# Patient Record
Sex: FEMALE | Race: WHITE | NOT HISPANIC OR LATINO | Employment: FULL TIME | URBAN - METROPOLITAN AREA
[De-identification: names, ages, dates, MRNs, and addresses within clinical notes are randomized per-mention and may not be internally consistent; named-entity substitution may affect disease eponyms.]

---

## 2018-03-20 LAB
A/G RATIO (HISTORICAL): 1.2 (ref 0.7–1.7)
ABNORMAL PROTEIN BAND (HISTORICAL): ABNORMAL G/DL
ALBUMIN ELP (HISTORICAL): 3.8 G/DL (ref 2.9–4.4)
ALPHA 1 (HISTORICAL): 0.2 G/DL (ref 0–0.4)
ALPHA 2 (HISTORICAL): 1.1 G/DL (ref 0.4–1)
BETA GLOBULIN, SERUM (HISTORICAL): 1 G/DL (ref 0.7–1.3)
GAMMA GLOBULIN (HISTORICAL): 1 G/DL (ref 0.4–1.8)
PROT ELEC SERUM (HISTORICAL): ABNORMAL
TOT. GLOBULIN, SERUM (HISTORICAL): 3.3 G/DL (ref 2.2–3.9)
TOTAL PROTEIN (HISTORICAL): 7.1 G/DL (ref 6–8.5)

## 2018-03-21 LAB
IMMUNOGLOBULIN A (HISTORICAL): 196 MG/DL (ref 87–352)
IMMUNOGLOBULIN G (HISTORICAL): 1026 MG/DL (ref 700–1600)
IMMUNOGLOBULIN M (HISTORICAL): 102 MG/DL (ref 26–217)

## 2018-03-23 LAB
BACKGROUND (HISTORICAL): NORMAL
DIRECTOR REVIEW (HISTORICAL): NORMAL
INTERPRETATION (HISTORICAL): NORMAL
Lab: NORMAL %
METHODOLOGY (HISTORICAL): NORMAL

## 2018-10-19 ENCOUNTER — HOSPITAL ENCOUNTER (OUTPATIENT)
Dept: RADIOLOGY | Facility: HOSPITAL | Age: 58
Discharge: HOME | End: 2018-10-19
Payer: COMMERCIAL

## 2018-10-19 ENCOUNTER — TRANSCRIBE ORDERS (OUTPATIENT)
Dept: ADMINISTRATIVE | Facility: HOSPITAL | Age: 58
End: 2018-10-19

## 2018-10-19 DIAGNOSIS — R05.9 COUGH: ICD-10-CM

## 2018-10-19 DIAGNOSIS — R05.9 COUGH: Primary | ICD-10-CM

## 2018-10-19 PROCEDURE — 71046 X-RAY EXAM CHEST 2 VIEWS: CPT

## 2018-10-20 ENCOUNTER — APPOINTMENT (OUTPATIENT)
Dept: LAB | Facility: HOSPITAL | Age: 58
End: 2018-10-20
Payer: COMMERCIAL

## 2018-10-20 ENCOUNTER — TRANSCRIBE ORDERS (OUTPATIENT)
Dept: ADMINISTRATIVE | Facility: HOSPITAL | Age: 58
End: 2018-10-20

## 2018-10-20 DIAGNOSIS — R05.9 COUGH: ICD-10-CM

## 2018-10-20 DIAGNOSIS — R05.9 COUGH: Primary | ICD-10-CM

## 2018-10-20 PROCEDURE — 87070 CULTURE OTHR SPECIMN AEROBIC: CPT

## 2018-10-20 PROCEDURE — 87205 SMEAR GRAM STAIN: CPT

## 2018-10-22 LAB
BACTERIA SPT RESP CULT: NORMAL
GRAM STN SPEC: NORMAL

## 2018-10-27 ENCOUNTER — TRANSCRIBE ORDERS (OUTPATIENT)
Dept: ADMINISTRATIVE | Facility: HOSPITAL | Age: 58
End: 2018-10-27

## 2018-10-27 ENCOUNTER — HOSPITAL ENCOUNTER (OUTPATIENT)
Dept: RADIOLOGY | Facility: HOSPITAL | Age: 58
Discharge: HOME | End: 2018-10-27
Payer: COMMERCIAL

## 2018-10-27 DIAGNOSIS — R93.89 ABNORMAL CHEST X-RAY: Primary | ICD-10-CM

## 2018-10-27 DIAGNOSIS — R93.89 ABNORMAL CHEST X-RAY: ICD-10-CM

## 2018-10-27 PROCEDURE — 71046 X-RAY EXAM CHEST 2 VIEWS: CPT

## 2018-10-30 ENCOUNTER — TRANSCRIBE ORDERS (OUTPATIENT)
Dept: ADMINISTRATIVE | Facility: HOSPITAL | Age: 58
End: 2018-10-30

## 2018-10-30 DIAGNOSIS — Z12.31 ENCOUNTER FOR SCREENING MAMMOGRAM FOR MALIGNANT NEOPLASM OF BREAST: Primary | ICD-10-CM

## 2018-11-13 ENCOUNTER — HOSPITAL ENCOUNTER (OUTPATIENT)
Dept: MAMMOGRAPHY | Facility: HOSPITAL | Age: 58
Discharge: HOME | End: 2018-11-13
Attending: SPECIALIST
Payer: COMMERCIAL

## 2018-11-13 DIAGNOSIS — Z12.31 ENCOUNTER FOR SCREENING MAMMOGRAM FOR MALIGNANT NEOPLASM OF BREAST: ICD-10-CM

## 2018-11-13 PROCEDURE — 77067 SCR MAMMO BI INCL CAD: CPT

## 2018-11-13 PROCEDURE — 77063 BREAST TOMOSYNTHESIS BI: CPT

## 2019-02-11 ENCOUNTER — HOSPITAL ENCOUNTER (EMERGENCY)
Facility: HOSPITAL | Age: 59
Discharge: HOME | End: 2019-02-11
Payer: COMMERCIAL

## 2019-02-11 ENCOUNTER — APPOINTMENT (EMERGENCY)
Dept: CT IMAGING | Facility: HOSPITAL | Age: 59
End: 2019-02-11
Payer: COMMERCIAL

## 2019-02-11 VITALS
HEIGHT: 63 IN | OXYGEN SATURATION: 97 % | RESPIRATION RATE: 16 BRPM | BODY MASS INDEX: 33.13 KG/M2 | HEART RATE: 74 BPM | SYSTOLIC BLOOD PRESSURE: 113 MMHG | DIASTOLIC BLOOD PRESSURE: 65 MMHG | TEMPERATURE: 98.5 F | WEIGHT: 187 LBS

## 2019-02-11 DIAGNOSIS — R10.31 ACUTE RIGHT LOWER QUADRANT PAIN: Primary | ICD-10-CM

## 2019-02-11 LAB
ALBUMIN SERPL BCP-MCNC: 4.2 G/DL (ref 3.5–5.7)
ALP SERPL-CCNC: 62 U/L (ref 40–150)
ALT SERPL W P-5'-P-CCNC: 19 U/L (ref 7–52)
ANION GAP SERPL CALCULATED.3IONS-SCNC: 9 MMOL/L (ref 4–13)
APTT PPP: 38 SECONDS (ref 26–38)
AST SERPL W P-5'-P-CCNC: 16 U/L (ref 13–39)
BASOPHILS # BLD AUTO: 0.1 THOUSANDS/ΜL (ref 0–0.1)
BASOPHILS NFR BLD AUTO: 1 % (ref 0–2)
BILIRUB SERPL-MCNC: 0.2 MG/DL (ref 0.2–1)
BUN SERPL-MCNC: 19 MG/DL (ref 7–25)
CALCIUM SERPL-MCNC: 9.3 MG/DL (ref 8.6–10.5)
CHLORIDE SERPL-SCNC: 104 MMOL/L (ref 98–107)
CO2 SERPL-SCNC: 24 MMOL/L (ref 21–31)
CREAT SERPL-MCNC: 0.91 MG/DL (ref 0.6–1.2)
EOSINOPHIL # BLD AUTO: 0.2 THOUSAND/ΜL (ref 0–0.61)
EOSINOPHIL NFR BLD AUTO: 2 % (ref 0–5)
ERYTHROCYTE [DISTWIDTH] IN BLOOD BY AUTOMATED COUNT: 13.7 % (ref 11.5–14.5)
GFR SERPL CREATININE-BSD FRML MDRD: 71 ML/MIN/1.73SQ M
GLUCOSE SERPL-MCNC: 106 MG/DL (ref 65–99)
HCT VFR BLD AUTO: 41 % (ref 42–47)
HGB BLD-MCNC: 13.3 G/DL (ref 12–16)
INR PPP: 1.01 (ref 0.9–1.5)
LIPASE SERPL-CCNC: 25 U/L (ref 11–82)
LYMPHOCYTES # BLD AUTO: 2.6 THOUSANDS/ΜL (ref 0.6–4.47)
LYMPHOCYTES NFR BLD AUTO: 28 % (ref 21–51)
MCH RBC QN AUTO: 28.9 PG (ref 26–34)
MCHC RBC AUTO-ENTMCNC: 32.3 G/DL (ref 31–37)
MCV RBC AUTO: 89 FL (ref 81–99)
MONOCYTES # BLD AUTO: 0.6 THOUSAND/ΜL (ref 0.17–1.22)
MONOCYTES NFR BLD AUTO: 6 % (ref 2–12)
NEUTROPHILS # BLD AUTO: 5.8 THOUSANDS/ΜL (ref 1.4–6.5)
NEUTS SEG NFR BLD AUTO: 63 % (ref 42–75)
NRBC BLD AUTO-RTO: 0 /100 WBCS
PLATELET # BLD AUTO: 329 THOUSANDS/UL (ref 149–390)
PMV BLD AUTO: 8.3 FL (ref 8.6–11.7)
POTASSIUM SERPL-SCNC: 3.8 MMOL/L (ref 3.5–5.5)
PROT SERPL-MCNC: 7.4 G/DL (ref 6.4–8.9)
PROTHROMBIN TIME: 11.7 SECONDS (ref 10.2–13)
RBC # BLD AUTO: 4.59 MILLION/UL (ref 3.9–5.2)
SODIUM SERPL-SCNC: 137 MMOL/L (ref 134–143)
WBC # BLD AUTO: 9.2 THOUSAND/UL (ref 4.8–10.8)

## 2019-02-11 PROCEDURE — 80053 COMPREHEN METABOLIC PANEL: CPT

## 2019-02-11 PROCEDURE — 83690 ASSAY OF LIPASE: CPT

## 2019-02-11 PROCEDURE — 96374 THER/PROPH/DIAG INJ IV PUSH: CPT

## 2019-02-11 PROCEDURE — 85610 PROTHROMBIN TIME: CPT

## 2019-02-11 PROCEDURE — 36415 COLL VENOUS BLD VENIPUNCTURE: CPT

## 2019-02-11 PROCEDURE — 85025 COMPLETE CBC W/AUTO DIFF WBC: CPT

## 2019-02-11 PROCEDURE — 99284 EMERGENCY DEPT VISIT MOD MDM: CPT

## 2019-02-11 PROCEDURE — 74176 CT ABD & PELVIS W/O CONTRAST: CPT

## 2019-02-11 PROCEDURE — 85730 THROMBOPLASTIN TIME PARTIAL: CPT

## 2019-02-11 RX ORDER — AMLODIPINE BESYLATE 5 MG/1
5 TABLET ORAL DAILY
COMMUNITY

## 2019-02-11 RX ORDER — KETOROLAC TROMETHAMINE 10 MG/1
10 TABLET, FILM COATED ORAL EVERY 6 HOURS PRN
Qty: 12 TABLET | Refills: 0 | Status: SHIPPED | OUTPATIENT
Start: 2019-02-11 | End: 2019-03-22 | Stop reason: ALTCHOICE

## 2019-02-11 RX ORDER — KETOROLAC TROMETHAMINE 30 MG/ML
30 INJECTION, SOLUTION INTRAMUSCULAR; INTRAVENOUS ONCE
Status: COMPLETED | OUTPATIENT
Start: 2019-02-11 | End: 2019-02-11

## 2019-02-11 RX ADMIN — KETOROLAC TROMETHAMINE 30 MG: 30 INJECTION, SOLUTION INTRAMUSCULAR; INTRAVENOUS at 16:10

## 2019-02-11 NOTE — ED PROVIDER NOTES
History  Chief Complaint   Patient presents with    Abdominal Pain     RLQ since Saturday upon awakening    Constipation     possible     Verito Cornejo is a 61-year-old female who came to the emergency department due to right lower quadrant pain that started about 2 days prior to arrival   Patient denies vomiting or diarrhea  Her last bowel movement was today  She denies dysuria, hematuria or frequency of urination  She denies fever or chills  History provided by:  Patient   used: No    Abdominal Pain   Pain location:  RLQ  Pain quality: aching    Pain radiates to:  Does not radiate  Pain severity:  Moderate  Onset quality:  Gradual  Duration:  2 days  Timing:  Constant  Progression:  Worsening  Chronicity:  New  Context: not alcohol use, not awakening from sleep, not diet changes, not eating, not laxative use, not medication withdrawal, not previous surgeries, not recent illness, not recent sexual activity, not recent travel, not retching, not sick contacts, not suspicious food intake and not trauma    Relieved by:  Nothing  Worsened by:  Nothing  Ineffective treatments:  None tried  Associated symptoms: no anorexia, no belching, no chest pain, no chills, no constipation, no cough, no diarrhea, no dysuria, no fatigue, no fever, no flatus, no hematemesis, no hematochezia, no hematuria, no melena, no nausea, no shortness of breath, no sore throat, no vaginal bleeding, no vaginal discharge and no vomiting        Prior to Admission Medications   Prescriptions Last Dose Informant Patient Reported? Taking?    amLODIPine (NORVASC) 5 mg tablet 2/11/2019 at Unknown time  Yes Yes   Sig: Take 5 mg by mouth daily   esomeprazole (NEXIUM) 20 mg capsule 2/10/2019 at Unknown time  Yes Yes   Sig: Take 20 mg by mouth daily      Facility-Administered Medications: None       Past Medical History:   Diagnosis Date    GERD (gastroesophageal reflux disease)     Hypertension        Past Surgical History:   Procedure Laterality Date     SECTION      CHOLECYSTECTOMY         History reviewed  No pertinent family history  I have reviewed and agree with the history as documented  Social History     Tobacco Use    Smoking status: Former Smoker     Last attempt to quit: 2018     Years since quittin 2    Smokeless tobacco: Never Used   Substance Use Topics    Alcohol use: Not Currently    Drug use: Never        Review of Systems   Constitutional: Negative for chills, fatigue and fever  HENT: Negative for sore throat  Eyes: Negative  Respiratory: Negative for cough and shortness of breath  Cardiovascular: Negative for chest pain  Gastrointestinal: Positive for abdominal pain  Negative for anorexia, constipation, diarrhea, flatus, hematemesis, hematochezia, melena, nausea and vomiting  Endocrine: Negative  Genitourinary: Negative for dysuria, hematuria, vaginal bleeding and vaginal discharge  Musculoskeletal: Negative  Skin: Negative  Allergic/Immunologic: Negative  Neurological: Negative  Hematological: Negative  Psychiatric/Behavioral: Negative  Physical Exam  Physical Exam   Constitutional: She is oriented to person, place, and time  She appears well-developed and well-nourished  Non-toxic appearance  She does not appear ill  No distress  HENT:   Head: Normocephalic and atraumatic  Mouth/Throat: Oropharynx is clear and moist  No oropharyngeal exudate  Eyes: Pupils are equal, round, and reactive to light  EOM are normal  No scleral icterus  Cardiovascular: Normal rate, regular rhythm, normal heart sounds and intact distal pulses  Pulmonary/Chest: Effort normal and breath sounds normal  No stridor  No respiratory distress  She has no wheezes  She has no rhonchi  She has no rales  She exhibits no tenderness  Abdominal: Soft  Normal appearance and bowel sounds are normal  There is tenderness in the right lower quadrant   There is no CVA tenderness  Neurological: She is alert and oriented to person, place, and time  Skin: Skin is warm and dry  No rash noted  She is not diaphoretic  No cyanosis or erythema  No pallor  Psychiatric: She has a normal mood and affect  Her behavior is normal    Nursing note and vitals reviewed  Vital Signs  ED Triage Vitals [02/11/19 1542]   Temperature Pulse Respirations Blood Pressure SpO2   98 5 °F (36 9 °C) 84 16 136/63 98 %      Temp Source Heart Rate Source Patient Position - Orthostatic VS BP Location FiO2 (%)   Temporal Monitor Sitting Left arm --      Pain Score       1           Vitals:    02/11/19 1542   BP: 136/63   Pulse: 84   Patient Position - Orthostatic VS: Sitting       Visual Acuity      ED Medications  Medications   ketorolac (TORADOL) injection 30 mg (30 mg Intravenous Given 2/11/19 1610)       Diagnostic Studies  Results Reviewed     Procedure Component Value Units Date/Time    Lipase [[id number]]  (Normal) Collected:  02/11/19 1606    Lab Status:  Final result Specimen:  Blood from Arm, Left Updated:  02/11/19 1634     Lipase 25 u/L     Comprehensive metabolic panel [[id number]]  (Abnormal) Collected:  02/11/19 1606    Lab Status:  Final result Specimen:  Blood from Arm, Left Updated:  02/11/19 1634     Sodium 137 mmol/L      Potassium 3 8 mmol/L      Chloride 104 mmol/L      CO2 24 mmol/L      ANION GAP 9 mmol/L      BUN 19 mg/dL      Creatinine 0 91 mg/dL      Glucose 106 mg/dL      Calcium 9 3 mg/dL      AST 16 U/L      ALT 19 U/L      Alkaline Phosphatase 62 U/L      Total Protein 7 4 g/dL      Albumin 4 2 g/dL      Total Bilirubin 0 20 mg/dL      eGFR 71 ml/min/1 73sq m     Narrative:       National Kidney Disease Education Program recommendations are as follows:  GFR calculation is accurate only with a steady state creatinine  Chronic Kidney disease less than 60 ml/min/1 73 sq  meters  Kidney failure less than 15 ml/min/1 73 sq  meters      Protime-INR [[id number]]  (Normal) Collected: 02/11/19 1606    Lab Status:  Final result Specimen:  Blood from Arm, Left Updated:  02/11/19 1626     Protime 11 7 seconds      INR 1 01    APTT [[id number]]  (Normal) Collected:  02/11/19 1606    Lab Status:  Final result Specimen:  Blood from Arm, Left Updated:  02/11/19 1626     PTT 38 seconds     CBC and differential [[id number]]  (Abnormal) Collected:  02/11/19 1606    Lab Status:  Final result Specimen:  Blood from Arm, Left Updated:  02/11/19 1613     WBC 9 20 Thousand/uL      RBC 4 59 Million/uL      Hemoglobin 13 3 g/dL      Hematocrit 41 0 %      MCV 89 fL      MCH 28 9 pg      MCHC 32 3 g/dL      RDW 13 7 %      MPV 8 3 fL      Platelets 866 Thousands/uL      nRBC 0 /100 WBCs      Neutrophils Relative 63 %      Lymphocytes Relative 28 %      Monocytes Relative 6 %      Eosinophils Relative 2 %      Basophils Relative 1 %      Neutrophils Absolute 5 80 Thousands/µL      Lymphocytes Absolute 2 60 Thousands/µL      Monocytes Absolute 0 60 Thousand/µL      Eosinophils Absolute 0 20 Thousand/µL      Basophils Absolute 0 10 Thousands/µL     UA w Reflex to Microscopic [[id number]]     Lab Status:  No result Specimen:  Urine                  CT abdomen pelvis wo contrast   Final Result by Darinel Bucio MD (02/11 1641)      No acute intra-abdominal abnormality  No free air or free fluid  Normal appendix visualized  Workstation performed: [id number]                    Procedures  Procedures       Phone Contacts  ED Phone Contact    ED Course  ED Course as of Feb 11 1653   Mon Feb 11, 2019   1650 Patient is resting comfortably on the stretcher  I discussed with her the results of her blood work and the CT scan of the abdomen pelvis which showed no sign of acute appendicitis                                    MDM  Number of Diagnoses or Management Options  Acute right lower quadrant pain: new and requires workup     Amount and/or Complexity of Data Reviewed  Clinical lab tests: ordered and reviewed  Tests in the radiology section of CPT®: ordered and reviewed  Tests in the medicine section of CPT®: ordered and reviewed    Risk of Complications, Morbidity, and/or Mortality  Presenting problems: low  Diagnostic procedures: low  Management options: low    Patient Progress  Patient progress: stable      Disposition  Final diagnoses:   Acute right lower quadrant pain     Time reflects when diagnosis was documented in both MDM as applicable and the Disposition within this note     Time User Action Codes Description Comment    2/11/2019  3:50 PM Nishant Carl Add [R10 31] Acute right lower quadrant pain       ED Disposition     ED Disposition Condition Date/Time Comment    Discharge Stable Mon Feb 11, 9450  9:37 PM [facility] discharge to home/self care  Follow-up Information     Follow up With Specialties Details Why Contact Info    Emily Powers, DO Internal Medicine In 3 days  [address]  [phone]            Patient's Medications   Discharge Prescriptions    KETOROLAC (TORADOL) 10 MG TABLET    Take 1 tablet (10 mg total) by mouth every 6 (six) hours as needed for moderate pain for up to 12 doses       Start Date: 2/11/2019 End Date: --       Order Dose: 10 mg       Quantity: 12 tablet    Refills: 0     No discharge procedures on file      ED Provider  Electronically Signed by           Maggie Hancock MD  02/11/19 8744

## 2019-03-22 ENCOUNTER — OFFICE VISIT (OUTPATIENT)
Dept: URGENT CARE | Facility: CLINIC | Age: 59
End: 2019-03-22
Payer: COMMERCIAL

## 2019-03-22 VITALS
HEART RATE: 77 BPM | TEMPERATURE: 97.5 F | OXYGEN SATURATION: 99 % | HEIGHT: 63 IN | WEIGHT: 187 LBS | BODY MASS INDEX: 33.13 KG/M2 | RESPIRATION RATE: 16 BRPM | SYSTOLIC BLOOD PRESSURE: 163 MMHG | DIASTOLIC BLOOD PRESSURE: 84 MMHG

## 2019-03-22 DIAGNOSIS — A60.1 HERPES SIMPLEX INFECTION OF PERIANAL SKIN: Primary | ICD-10-CM

## 2019-03-22 DIAGNOSIS — B37.9 CANDIDA ALBICANS INFECTION: ICD-10-CM

## 2019-03-22 PROCEDURE — 99203 OFFICE O/P NEW LOW 30 MIN: CPT | Performed by: NURSE PRACTITIONER

## 2019-03-22 RX ORDER — BUPROPION HYDROCHLORIDE 100 MG/1
150 TABLET ORAL
COMMUNITY
End: 2019-03-22 | Stop reason: ALTCHOICE

## 2019-03-22 RX ORDER — VALACYCLOVIR HYDROCHLORIDE 1 G/1
1000 TABLET, FILM COATED ORAL 2 TIMES DAILY
Qty: 14 TABLET | Refills: 0 | Status: SHIPPED | OUTPATIENT
Start: 2019-03-22 | End: 2021-10-07 | Stop reason: ALTCHOICE

## 2019-03-22 RX ORDER — CLOTRIMAZOLE AND BETAMETHASONE DIPROPIONATE 10; .64 MG/G; MG/G
CREAM TOPICAL 2 TIMES DAILY
Qty: 30 G | Refills: 0 | Status: SHIPPED | OUTPATIENT
Start: 2019-03-22 | End: 2021-10-07 | Stop reason: ALTCHOICE

## 2019-03-22 RX ORDER — HYDROCODONE BITARTRATE AND ACETAMINOPHEN 5; 325 MG/1; MG/1
TABLET ORAL
Refills: 0 | COMMUNITY
Start: 2019-03-13 | End: 2019-03-22 | Stop reason: ALTCHOICE

## 2019-03-22 RX ORDER — OMEPRAZOLE 40 MG/1
CAPSULE, DELAYED RELEASE ORAL
Refills: 6 | COMMUNITY
Start: 2019-01-09 | End: 2019-03-22 | Stop reason: ALTCHOICE

## 2019-03-22 NOTE — PATIENT INSTRUCTIONS
Genital Herpes Simplex   WHAT YOU NEED TO KNOW:   Genital herpes is a sexually transmitted infection (STI) that is caused by the herpes simplex virus (HSV)  It is spread through oral, vaginal, or anal sex  It may be spread even if you do not see blisters  It can also be spread to other areas of your body, including your eyes, by touching open blisters  If you are pregnant, it may be spread to your baby while he is still in your womb or during vaginal delivery  Unprotected sex or sex with multiple partners increases your risk for genital herpes  DISCHARGE INSTRUCTIONS:   Call 911 for any of the following:   · You have trouble breathing  · You have a seizure  · Your neck is stiff  · You have trouble thinking clearly  Contact your healthcare provider if:   · You have chills or a fever  · You have painful blisters on your penis, vagina, anus, or mouth  · Fluid or blood is coming out of your genitals  · You have trouble urinating  · You think you are pregnant and you are bleeding from your vagina  · You have trouble chewing or swallowing  · Your symptoms do not get better, or they get worse, even after treatment  · You have questions or concerns about your condition or care  Medicines:   · Antivirals  may help decrease your symptoms  · Numbing cream or ointment  may help decrease pain  · NSAIDs , such as ibuprofen, help decrease swelling, pain, and fever  This medicine is available with or without a doctor's order  NSAIDs can cause stomach bleeding or kidney problems in certain people  If you take blood thinner medicine, always ask your healthcare provider if NSAIDs are safe for you  Always read the medicine label and follow directions  · Take your medicine as directed  Contact your healthcare provider if you think your medicine is not helping or if you have side effects  Tell him or her if you are allergic to any medicine   Keep a list of the medicines, vitamins, and herbs you take  Include the amounts, and when and why you take them  Bring the list or the pill bottles to follow-up visits  Carry your medicine list with you in case of an emergency  Manage your symptoms:  Do the following to be more comfortable when your infection is active:  · Keep the blisters clean and dry  Wash them with soap and warm water, and pat dry gently  · Wear cotton underwear and loose clothing  This may help to keep the blisters dry and keep clothes from rubbing  · Apply ice  on the area for 15 to 20 minutes every hour or as directed  Use an ice pack, or put crushed ice in a plastic bag  Cover it with a towel  Ice helps prevent tissue damage and decreases swelling and pain  · Apply heat  on the area for 20 to 30 minutes every 2 hours for as many days as directed  A warm bath may also help  Heat helps decrease pain and muscle spasms  Prevent the spread of genital herpes:   · Use condoms  Use a latex condom when you have oral, genital, and anal sex  Use a new condom each time  Use a polyurethane condom if you are allergic to latex  · Try not to touch your blisters  Wash your hands before and after you touch the area  Do not kiss anyone if you have blisters around your mouth  Do not breastfeed if you have blisters on your breast      · Tell your partners  that you have genital herpes  Do not have sex until he or she knows that you have genital herpes  Ask your healthcare provider for ways to tell partners about your infection  · Tell your healthcare providers  that you have genital herpes  If you are pregnant, your baby may need special monitoring  Inform your healthcare provider of your condition to avoid spreading the infection to your baby  Follow up with your healthcare provider as directed:  Write down your questions so you remember to ask them during your visits     © 2017 [employer] Information is for End User's use only and may not be sold, redistributed or otherwise used for commercial purposes  All illustrations and images included in CareNotes® are the copyrighted property of A [employer]  or Jese Torres  The above information is an  only  It is not intended as medical advice for individual conditions or treatments  Talk to your doctor, nurse or pharmacist before following any medical regimen to see if it is safe and effective for you

## 2019-03-22 NOTE — PROGRESS NOTES
[facility]        NAME: Rl Gregory is a 66 y o  female  : 1960    MRN: [id number]  DATE: 2019  TIME: 5:50 PM    Assessment and Plan   Herpes simplex infection of perianal skin [A60 1]  1  Herpes simplex infection of perianal skin  valACYclovir (VALTREX) 1,000 mg tablet   2  Candida albicans infection  clotrimazole-betamethasone (LOTRISONE) 1-0 05 % cream         Patient Instructions       Follow up with PCP in 3-5 days  Proceed to  ER if symptoms worsen  Chief Complaint     Chief Complaint   Patient presents with    Wound Infection     buttocks    Herpes Zoster         History of Present Illness       53-year-old female with a history of genital herpes  She reports a flare-up of genital herpes for about 1 week  In addition to the genital herpes she is experiencing perianal redness, pain, itching  Review of Systems   Review of Systems   Constitutional: Negative  HENT: Negative  Eyes: Negative  Respiratory: Negative  Cardiovascular: Negative  Gastrointestinal: Negative  Genitourinary: Positive for genital sores  Negative for vaginal bleeding and vaginal discharge  Musculoskeletal: Negative  Skin:        Perianal redness, itching, pain  Neurological: Negative            Current Medications       Current Outpatient Medications:     amLODIPine (NORVASC) 5 mg tablet, Take 5 mg by mouth daily, Disp: , Rfl:     esomeprazole (NEXIUM) 20 mg capsule, Take 20 mg by mouth daily, Disp: , Rfl:     clotrimazole-betamethasone (LOTRISONE) 1-0 05 % cream, Apply topically 2 (two) times a day, Disp: 30 g, Rfl: 0    valACYclovir (VALTREX) 1,000 mg tablet, Take 1 tablet (1,000 mg total) by mouth 2 (two) times a day for 7 days, Disp: 14 tablet, Rfl: 0    Current Allergies     Allergies as of 2019    (No Known Allergies)            The following portions of the patient's history were reviewed and updated as appropriate: allergies, current medications, past family history, past medical history, past social history, past surgical history and problem list      Past Medical History:   Diagnosis Date    Anxiety     Depression     GERD (gastroesophageal reflux disease)     Hypertension        Past Surgical History:   Procedure Laterality Date     SECTION      CHOLECYSTECTOMY         History reviewed  No pertinent family history  Medications have been verified  Objective   /84   Pulse 77   Temp 97 5 °F (36 4 °C)   Resp 16   Ht 5' 3" (1 6 m)   Wt 84 8 kg (187 lb)   SpO2 99%   BMI 33 13 kg/m²        Physical Exam     Physical Exam   Constitutional: She is oriented to person, place, and time  She appears well-developed and well-nourished  No distress  HENT:   Head: Normocephalic and atraumatic  Right Ear: External ear normal    Left Ear: External ear normal    Mouth/Throat: Oropharynx is clear and moist    Eyes: Pupils are equal, round, and reactive to light  Conjunctivae and EOM are normal    Neck: Normal range of motion  Neck supple  Cardiovascular: Normal rate, regular rhythm and normal heart sounds  Pulmonary/Chest: Effort normal and breath sounds normal    Abdominal: Soft  Bowel sounds are normal    Genitourinary:         Genitourinary Comments: There are scattered vesicles noted around the vagina on the labia majora and on her upper thighs  Neurological: She is alert and oriented to person, place, and time  Skin: Skin is warm  She is not diaphoretic  Psychiatric: She has a normal mood and affect   Her behavior is normal  Judgment and thought content normal

## 2019-08-19 ENCOUNTER — OFFICE VISIT (OUTPATIENT)
Dept: SURGERY | Facility: CLINIC | Age: 59
End: 2019-08-19
Payer: COMMERCIAL

## 2019-08-19 VITALS
DIASTOLIC BLOOD PRESSURE: 72 MMHG | RESPIRATION RATE: 16 BRPM | SYSTOLIC BLOOD PRESSURE: 132 MMHG | WEIGHT: 189 LBS | TEMPERATURE: 98.3 F | HEART RATE: 76 BPM | HEIGHT: 63 IN | BODY MASS INDEX: 33.49 KG/M2

## 2019-08-19 DIAGNOSIS — M67.441 GANGLION CYST OF FLEXOR TENDON SHEATH OF FINGER OF RIGHT HAND: Primary | ICD-10-CM

## 2019-08-19 PROCEDURE — 99203 OFFICE O/P NEW LOW 30 MIN: CPT | Performed by: SURGERY

## 2019-08-19 NOTE — ASSESSMENT & PLAN NOTE
The patient is a pleasant 61-year-old right-hand-dominant female presenting with an 8 month history of a lump on the palmar surface of the proximal phalanx of the right ring finger  The patient works as an occupational therapist   She has been conscious of this palpable lump beneath the skin of the right ring finger for 8 months  It is becoming increasingly symptomatic on daily basis  She is interested in definitive treatment  She does note she has a history for psoriasis involving the skin of her upper extremities and hands  She is treated intermittently with steroids  She has no history of use of biologic agents  On physical examination she is a pleasant well-nourished well-appearing female  She is awake alert oriented  She has a pleasant disposition  She is a competent historian  Palpation of the proximal phalanx of the right ring finger on the palmar surface confirms the presence of a tendon sheath ganglion cyst     The diagnosis was explained to the patient  In light of this technically sensitive area I have advised definitive treatment by Dr Carlos William of hand surgery  The referral has been made  All questions were answered to the satisfaction of the patient she is agreeable to the treatment plan as outlined above

## 2019-08-19 NOTE — PROGRESS NOTES
Assessment/Plan:    Ganglion cyst of flexor tendon sheath of finger of right hand  The patient is a pleasant 61-year-old right-hand-dominant female presenting with an 8 month history of a lump on the palmar surface of the proximal phalanx of the right ring finger  The patient works as an occupational therapist   She has been conscious of this palpable lump beneath the skin of the right ring finger for 8 months  It is becoming increasingly symptomatic on daily basis  She is interested in definitive treatment  She does note she has a history for psoriasis involving the skin of her upper extremities and hands  She is treated intermittently with steroids  She has no history of use of biologic agents  On physical examination she is a pleasant well-nourished well-appearing female  She is awake alert oriented  She has a pleasant disposition  She is a competent historian  Palpation of the proximal phalanx of the right ring finger on the palmar surface confirms the presence of a tendon sheath ganglion cyst     The diagnosis was explained to the patient  In light of this technically sensitive area I have advised definitive treatment by Dr Tj Tejeda of hand surgery  The referral has been made  All questions were answered to the satisfaction of the patient she is agreeable to the treatment plan as outlined above  Diagnoses and all orders for this visit:    Ganglion cyst of flexor tendon sheath of finger of right hand  -     Ambulatory referral to Orthopedic Surgery; Future          Subjective:      Patient ID: Tammy Sanchez is a 64 y o  female  08-  Patient is here today for a cyst on her ring finger right hand  Stated that she has had this cyst for about 8 months was a size a baked bean and know a size of a kidney bean and if tries to hold a handle it hurts  Colorectal screening was reviewed with the patient and she is following up with [facility]      The following portions of the patient's history were reviewed and updated as appropriate: allergies, current medications, past family history, past medical history, past social history, past surgical history and problem list     Review of Systems   All other systems reviewed and are negative  Objective:      /72   Pulse 76   Temp 98 3 °F (36 8 °C) (Tympanic)   Resp 16   Ht 5' 3" (1 6 m)   Wt 85 7 kg (189 lb)   BMI 33 48 kg/m²          Physical Exam   Constitutional: She is oriented to person, place, and time  She appears well-developed and well-nourished  HENT:   Head: Normocephalic and atraumatic  Eyes: Pupils are equal, round, and reactive to light  Conjunctivae are normal    Neck: Normal range of motion  Neck supple  Cardiovascular: Normal rate and regular rhythm  Pulmonary/Chest: Effort normal and breath sounds normal    Abdominal: Soft  Bowel sounds are normal    Musculoskeletal: Normal range of motion  Palpable subcutaneous lump on the palmar surface of the proximal phalanx of the right ring finger  Physical findings consistent with the diagnosis of a tendon sheath ganglion cyst    Neurological: She is alert and oriented to person, place, and time  Skin: Skin is warm and dry     Psychiatric: Her behavior is normal  Judgment and thought content normal

## 2019-08-19 NOTE — LETTER
August 19, 2019     DO Serenity Cardoza  [address] [place] [address]    Patient: Juan Francisco Krishnamurthy   YOB: 1960   Date of Visit: 8/19/2019       Dear Dr Brooke Michaud: Thank you for referring Beverley Rowley to me for evaluation  Below are my notes for this consultation  If you have questions, please do not hesitate to call me  I look forward to following your patient along with you  Sincerely,        Raeford Alpers, MD        CC: No Recipients  Raeford Alpers, MD  8/19/2019  9:21 AM  Incomplete  Assessment/Plan:    Ganglion cyst of flexor tendon sheath of finger of right hand  The patient is a pleasant 61-year-old right-hand-dominant female presenting with an 8 month history of a lump on the palmar surface of the proximal phalanx of the right ring finger  The patient works as an occupational therapist   She has been conscious of this palpable lump beneath the skin of the right ring finger for 8 months  It is becoming increasingly symptomatic on daily basis  She is interested in definitive treatment  She does note she has a history for psoriasis involving the skin of her upper extremities and hands  She is treated intermittently with steroids  She has no history of use of biologic agents  On physical examination she is a pleasant well-nourished well-appearing female  She is awake alert oriented  She has a pleasant disposition  She is a competent historian  Palpation of the proximal phalanx of the right ring finger on the palmar surface confirms the presence of a tendon sheath ganglion cyst     The diagnosis was explained to the patient  In light of this technically sensitive area I have advised definitive treatment by Dr Jo Plaza of hand surgery  The referral has been made  All questions were answered to the satisfaction of the patient she is agreeable to the treatment plan as outlined above         Diagnoses and all orders for this visit:    Ganglion cyst of flexor tendon sheath of finger of right hand  -     Ambulatory referral to Orthopedic Surgery; Future          Subjective:      Patient ID: Baljinder Roman is a 64 y o  female  08-  Patient is here today for a cyst on her ring finger right hand  Stated that she has had this cyst for about 8 months was a size a baked bean and know a size of a kidney bean and if tries to hold a handle it hurts  Colorectal screening was reviewed with the patient and she is following up with [facility]  Catherine Harris      The following portions of the patient's history were reviewed and updated as appropriate: allergies, current medications, past family history, past medical history, past social history, past surgical history and problem list     Review of Systems   All other systems reviewed and are negative  Objective:      /72   Pulse 76   Temp 98 3 °F (36 8 °C) (Tympanic)   Resp 16   Ht 5' 3" (1 6 m)   Wt 85 7 kg (189 lb)   BMI 33 48 kg/m²           Physical Exam   Constitutional: She is oriented to person, place, and time  She appears well-developed and well-nourished  HENT:   Head: Normocephalic and atraumatic  Eyes: Pupils are equal, round, and reactive to light  Conjunctivae are normal    Neck: Normal range of motion  Neck supple  Cardiovascular: Normal rate and regular rhythm  Pulmonary/Chest: Effort normal and breath sounds normal    Abdominal: Soft  Bowel sounds are normal    Musculoskeletal: Normal range of motion  Palpable subcutaneous lump on the palmar surface of the proximal phalanx of the right ring finger  Physical findings consistent with the diagnosis of a tendon sheath ganglion cyst    Neurological: She is alert and oriented to person, place, and time  Skin: Skin is warm and dry     Psychiatric: Her behavior is normal  Judgment and thought content normal

## 2019-09-23 ENCOUNTER — TRANSCRIBE ORDERS (OUTPATIENT)
Dept: LAB | Facility: HOSPITAL | Age: 59
End: 2019-09-23

## 2019-09-23 ENCOUNTER — APPOINTMENT (OUTPATIENT)
Dept: LAB | Facility: HOSPITAL | Age: 59
End: 2019-09-23
Payer: COMMERCIAL

## 2019-09-23 DIAGNOSIS — E78.2 MIXED HYPERLIPIDEMIA: ICD-10-CM

## 2019-09-23 DIAGNOSIS — I10 HYPERTENSION, ESSENTIAL: Primary | ICD-10-CM

## 2019-09-23 DIAGNOSIS — I10 HYPERTENSION, ESSENTIAL: ICD-10-CM

## 2019-09-23 LAB
ALBUMIN SERPL BCP-MCNC: 4.5 G/DL (ref 3.5–5.7)
ALP SERPL-CCNC: 65 U/L (ref 40–150)
ALT SERPL W P-5'-P-CCNC: 18 U/L (ref 7–52)
ANION GAP SERPL CALCULATED.3IONS-SCNC: 9 MMOL/L (ref 4–13)
AST SERPL W P-5'-P-CCNC: 17 U/L (ref 13–39)
BACTERIA UR QL AUTO: ABNORMAL /HPF
BASOPHILS # BLD AUTO: 0.1 THOUSANDS/ΜL (ref 0–0.1)
BASOPHILS NFR BLD AUTO: 1 % (ref 0–2)
BILIRUB SERPL-MCNC: 0.4 MG/DL (ref 0.2–1)
BILIRUB UR QL STRIP: NEGATIVE
BUN SERPL-MCNC: 12 MG/DL (ref 7–25)
CALCIUM SERPL-MCNC: 9.2 MG/DL (ref 8.6–10.5)
CHLORIDE SERPL-SCNC: 101 MMOL/L (ref 98–107)
CHOLEST SERPL-MCNC: 216 MG/DL (ref 0–200)
CLARITY UR: CLEAR
CO2 SERPL-SCNC: 27 MMOL/L (ref 21–31)
COLOR UR: YELLOW
CREAT SERPL-MCNC: 0.78 MG/DL (ref 0.6–1.2)
EOSINOPHIL # BLD AUTO: 0.4 THOUSAND/ΜL (ref 0–0.61)
EOSINOPHIL NFR BLD AUTO: 4 % (ref 0–5)
ERYTHROCYTE [DISTWIDTH] IN BLOOD BY AUTOMATED COUNT: 14.9 % (ref 11.5–14.5)
GFR SERPL CREATININE-BSD FRML MDRD: 85 ML/MIN/1.73SQ M
GLUCOSE P FAST SERPL-MCNC: 103 MG/DL (ref 65–99)
GLUCOSE UR STRIP-MCNC: NEGATIVE MG/DL
HCT VFR BLD AUTO: 43.1 % (ref 42–47)
HDLC SERPL-MCNC: 48 MG/DL (ref 40–60)
HGB BLD-MCNC: 14.1 G/DL (ref 12–16)
HGB UR QL STRIP.AUTO: ABNORMAL
KETONES UR STRIP-MCNC: NEGATIVE MG/DL
LDLC SERPL CALC-MCNC: 118 MG/DL (ref 0–100)
LEUKOCYTE ESTERASE UR QL STRIP: ABNORMAL
LYMPHOCYTES # BLD AUTO: 1.6 THOUSANDS/ΜL (ref 0.6–4.47)
LYMPHOCYTES NFR BLD AUTO: 15 % (ref 21–51)
MCH RBC QN AUTO: 29.7 PG (ref 26–34)
MCHC RBC AUTO-ENTMCNC: 32.7 G/DL (ref 31–37)
MCV RBC AUTO: 91 FL (ref 81–99)
MONOCYTES # BLD AUTO: 0.7 THOUSAND/ΜL (ref 0.17–1.22)
MONOCYTES NFR BLD AUTO: 7 % (ref 2–12)
NEUTROPHILS # BLD AUTO: 7.9 THOUSANDS/ΜL (ref 1.4–6.5)
NEUTS SEG NFR BLD AUTO: 75 % (ref 42–75)
NITRITE UR QL STRIP: NEGATIVE
NON-SQ EPI CELLS URNS QL MICRO: ABNORMAL /HPF
NONHDLC SERPL-MCNC: 168 MG/DL
PH UR STRIP.AUTO: 6 [PH]
PLATELET # BLD AUTO: 316 THOUSANDS/UL (ref 149–390)
PMV BLD AUTO: 8.8 FL (ref 8.6–11.7)
POTASSIUM SERPL-SCNC: 3.3 MMOL/L (ref 3.5–5.5)
PROT SERPL-MCNC: 8.1 G/DL (ref 6.4–8.9)
PROT UR STRIP-MCNC: NEGATIVE MG/DL
RBC # BLD AUTO: 4.73 MILLION/UL (ref 3.9–5.2)
RBC #/AREA URNS AUTO: ABNORMAL /HPF
SODIUM SERPL-SCNC: 137 MMOL/L (ref 134–143)
SP GR UR STRIP.AUTO: 1.02 (ref 1–1.03)
TRIGL SERPL-MCNC: 249 MG/DL (ref 44–166)
UROBILINOGEN UR QL STRIP.AUTO: 0.2 E.U./DL
WBC # BLD AUTO: 10.7 THOUSAND/UL (ref 4.8–10.8)
WBC #/AREA URNS AUTO: ABNORMAL /HPF

## 2019-09-23 PROCEDURE — 85025 COMPLETE CBC W/AUTO DIFF WBC: CPT

## 2019-09-23 PROCEDURE — 80053 COMPREHEN METABOLIC PANEL: CPT

## 2019-09-23 PROCEDURE — 81001 URINALYSIS AUTO W/SCOPE: CPT

## 2019-09-23 PROCEDURE — 80061 LIPID PANEL: CPT

## 2019-09-23 PROCEDURE — 36415 COLL VENOUS BLD VENIPUNCTURE: CPT

## 2019-09-23 PROCEDURE — 81003 URINALYSIS AUTO W/O SCOPE: CPT

## 2019-09-27 ENCOUNTER — OFFICE VISIT (OUTPATIENT)
Dept: OBGYN CLINIC | Facility: CLINIC | Age: 59
End: 2019-09-27
Payer: COMMERCIAL

## 2019-09-27 VITALS — WEIGHT: 190 LBS | BODY MASS INDEX: 33.66 KG/M2

## 2019-09-27 DIAGNOSIS — M67.441 GANGLION CYST OF FLEXOR TENDON SHEATH OF FINGER OF RIGHT HAND: Primary | ICD-10-CM

## 2019-09-27 PROCEDURE — 99203 OFFICE O/P NEW LOW 30 MIN: CPT | Performed by: ORTHOPAEDIC SURGERY

## 2019-09-27 NOTE — PROGRESS NOTES
CHIEF COMPLAINT:  Chief Complaint   Patient presents with    Right Hand - Cyst       SUBJECTIVE:  Enma Sanchez is a 64y o  year old  female who presents to the office for evaluation of a lump over the volar ulnar aspect of the of the proximal phalanx of the right ring finger  Pt states that she first noticed the lump about 1 year ago but it has become larger in size over the past year  Pt states that it is not painful but she is no longer able to wear rigs on that finger  PAST MEDICAL HISTORY:  Past Medical History:   Diagnosis Date    Anxiety     Depression     GERD (gastroesophageal reflux disease)     Hypertension        PAST SURGICAL HISTORY:  Past Surgical History:   Procedure Laterality Date     SECTION      CHOLECYSTECTOMY         FAMILY HISTORY:  Family History   Problem Relation Age of Onset    Hypertension Mother     Transient ischemic attack Mother     Throat cancer Maternal Grandmother        SOCIAL HISTORY:  Social History     Tobacco Use    Smoking status: Former Smoker     Last attempt to quit: 2018     Years since quittin 9    Smokeless tobacco: Never Used   Substance Use Topics    Alcohol use: Not Currently    Drug use: Never       MEDICATIONS:    Current Outpatient Medications:     amLODIPine (NORVASC) 5 mg tablet, Take 5 mg by mouth daily, Disp: , Rfl:     clotrimazole-betamethasone (LOTRISONE) 1-0 05 % cream, Apply topically 2 (two) times a day, Disp: 30 g, Rfl: 0    esomeprazole (NEXIUM) 20 mg capsule, Take 20 mg by mouth daily, Disp: , Rfl:     valACYclovir (VALTREX) 1,000 mg tablet, Take 1 tablet (1,000 mg total) by mouth 2 (two) times a day for 7 days, Disp: 14 tablet, Rfl: 0    ALLERGIES:  No Known Allergies    REVIEW OF SYSTEMS:  Review of Systems   Constitutional: Negative for chills, fever and unexpected weight change  HENT: Negative for hearing loss, nosebleeds and sore throat  Eyes: Negative for pain, redness and visual disturbance  Respiratory: Negative for cough, shortness of breath and wheezing  Cardiovascular: Negative for chest pain, palpitations and leg swelling  Gastrointestinal: Negative for abdominal pain, nausea and vomiting  Endocrine: Negative for polydipsia and polyuria  Genitourinary: Negative for dysuria and hematuria  Skin: Negative for rash and wound  Neurological: Negative for dizziness and headaches  Psychiatric/Behavioral: Negative for decreased concentration, dysphoric mood and suicidal ideas  The patient is not nervous/anxious  VITALS:  There were no vitals filed for this visit  LABS:  HgA1c: No results found for: HGBA1C  BMP:   Lab Results   Component Value Date    CALCIUM 9 2 09/23/2019    K 3 3 (L) 09/23/2019    CO2 27 09/23/2019     09/23/2019    BUN 12 09/23/2019    CREATININE 0 78 09/23/2019       _____________________________________________________  PHYSICAL EXAMINATION:  General: well developed and well nourished, alert, oriented times 3 and appears comfortable  Psychiatric: Normal  HEENT: Trachea Midline, No torticollis  Pulmonary: No audible wheezing or respiratory distress   Skin: No Erythema, No Lacerations, No Fluctuation, No Ulcerations  Neurovascular: Sensation Intact to the Median, Ulnar, Radial Nerve, Motor Intact to the Median, Ulnar, Radial Nerve and Pulses Intact    MUSCULOSKELETAL EXAMINATION:  Right ring finger  Small palpable cyst like structure on the ulna volar aspect of the proximal phalanx  Skin intact     ___________________________________________________  STUDIES REVIEWED:  No studies reviewed         PROCEDURES PERFORMED:  Aspiration   Date/Time: 9/27/2019 12:47 PM  Consent given by: patient  Site marked: site marked  Timeout: Immediately prior to procedure a time out was called to verify the correct patient, procedure, equipment, support staff and site/side marked as required   Supporting Documentation  Indications: therapeutic   Procedure Details  Condition comment: ganglion cyst of the flexor tendon sheath of the right ring finger   Preparation: Patient was prepped and draped in the usual sterile fashion  Needle size: 25 G  Ultrasound guidance: no  Patient tolerance: patient tolerated the procedure well with no immediate complications  Dressing:  Sterile dressing applied              _____________________________________________________  ASSESSMENT/PLAN:    Ganglion cyst of the flexor tendon sheath- right ring finger   *aspiration was preformed today without complications  * Pt was advised that if the cyst returns we will consider repeat aspiration or excision  * Pt was advised to call the office if she has any questions or concerns      Follow Up:  Return if symptoms worsen or fail to improve          To Do Next Visit:  Re-evaluation of current issue    Scribe Attestation    I,:   Colby Barragan am acting as a scribe while in the presence of the attending physician :        I,:   Sandra Swain MD personally performed the services described in this documentation    as scribed in my presence :

## 2019-09-30 ENCOUNTER — TRANSCRIBE ORDERS (OUTPATIENT)
Dept: LAB | Facility: HOSPITAL | Age: 59
End: 2019-09-30

## 2019-09-30 ENCOUNTER — APPOINTMENT (OUTPATIENT)
Dept: LAB | Facility: HOSPITAL | Age: 59
End: 2019-09-30
Payer: COMMERCIAL

## 2019-09-30 DIAGNOSIS — E87.6 HYPOPOTASSEMIA: ICD-10-CM

## 2019-09-30 DIAGNOSIS — E87.6 HYPOPOTASSEMIA: Primary | ICD-10-CM

## 2019-09-30 LAB
ANION GAP SERPL CALCULATED.3IONS-SCNC: 6 MMOL/L (ref 4–13)
BUN SERPL-MCNC: 18 MG/DL (ref 7–25)
CALCIUM SERPL-MCNC: 9.6 MG/DL (ref 8.6–10.5)
CHLORIDE SERPL-SCNC: 103 MMOL/L (ref 98–107)
CO2 SERPL-SCNC: 29 MMOL/L (ref 21–31)
CREAT SERPL-MCNC: 0.95 MG/DL (ref 0.6–1.2)
GFR SERPL CREATININE-BSD FRML MDRD: 67 ML/MIN/1.73SQ M
GLUCOSE P FAST SERPL-MCNC: 103 MG/DL (ref 65–99)
POTASSIUM SERPL-SCNC: 3.9 MMOL/L (ref 3.5–5.5)
SODIUM SERPL-SCNC: 138 MMOL/L (ref 134–143)

## 2019-09-30 PROCEDURE — 80048 BASIC METABOLIC PNL TOTAL CA: CPT

## 2019-09-30 PROCEDURE — 36415 COLL VENOUS BLD VENIPUNCTURE: CPT

## 2020-09-16 ENCOUNTER — APPOINTMENT (OUTPATIENT)
Dept: LAB | Facility: HOSPITAL | Age: 60
End: 2020-09-16
Payer: COMMERCIAL

## 2020-09-16 ENCOUNTER — TRANSCRIBE ORDERS (OUTPATIENT)
Dept: LAB | Facility: HOSPITAL | Age: 60
End: 2020-09-16

## 2020-09-16 DIAGNOSIS — I10 HYPERTENSION, UNSPECIFIED TYPE: ICD-10-CM

## 2020-09-16 DIAGNOSIS — E78.5 HYPERLIPIDEMIA, UNSPECIFIED HYPERLIPIDEMIA TYPE: ICD-10-CM

## 2020-09-16 DIAGNOSIS — N39.0 URINARY TRACT INFECTION WITHOUT HEMATURIA, SITE UNSPECIFIED: ICD-10-CM

## 2020-09-16 DIAGNOSIS — N39.0 URINARY TRACT INFECTION WITHOUT HEMATURIA, SITE UNSPECIFIED: Primary | ICD-10-CM

## 2020-09-16 LAB
ALBUMIN SERPL BCP-MCNC: 4.7 G/DL (ref 3.5–5.7)
ALP SERPL-CCNC: 55 U/L (ref 40–150)
ALT SERPL W P-5'-P-CCNC: 33 U/L (ref 7–52)
ANION GAP SERPL CALCULATED.3IONS-SCNC: 8 MMOL/L (ref 4–13)
AST SERPL W P-5'-P-CCNC: 22 U/L (ref 13–39)
BASOPHILS # BLD AUTO: 0.1 THOUSANDS/ΜL (ref 0–0.1)
BASOPHILS NFR BLD AUTO: 1 % (ref 0–2)
BILIRUB SERPL-MCNC: 0.3 MG/DL (ref 0.2–1)
BILIRUB UR QL STRIP: NEGATIVE
BUN SERPL-MCNC: 16 MG/DL (ref 7–25)
CALCIUM SERPL-MCNC: 9.4 MG/DL (ref 8.6–10.5)
CHLORIDE SERPL-SCNC: 104 MMOL/L (ref 98–107)
CLARITY UR: CLEAR
CO2 SERPL-SCNC: 28 MMOL/L (ref 21–31)
COLOR UR: YELLOW
CREAT SERPL-MCNC: 0.76 MG/DL (ref 0.6–1.2)
EOSINOPHIL # BLD AUTO: 0.2 THOUSAND/ΜL (ref 0–0.61)
EOSINOPHIL NFR BLD AUTO: 2 % (ref 0–5)
ERYTHROCYTE [DISTWIDTH] IN BLOOD BY AUTOMATED COUNT: 14.9 % (ref 11.5–14.5)
GFR SERPL CREATININE-BSD FRML MDRD: 87 ML/MIN/1.73SQ M
GLUCOSE P FAST SERPL-MCNC: 106 MG/DL (ref 65–99)
GLUCOSE UR STRIP-MCNC: NEGATIVE MG/DL
HCT VFR BLD AUTO: 42.6 % (ref 42–47)
HGB BLD-MCNC: 14.2 G/DL (ref 12–16)
HGB UR QL STRIP.AUTO: NEGATIVE
KETONES UR STRIP-MCNC: NEGATIVE MG/DL
LDLC SERPL DIRECT ASSAY-MCNC: 128.8 MG/DL (ref 0–100)
LEUKOCYTE ESTERASE UR QL STRIP: NEGATIVE
LYMPHOCYTES # BLD AUTO: 2.7 THOUSANDS/ΜL (ref 0.6–4.47)
LYMPHOCYTES NFR BLD AUTO: 31 % (ref 21–51)
MCH RBC QN AUTO: 29.7 PG (ref 26–34)
MCHC RBC AUTO-ENTMCNC: 33.5 G/DL (ref 31–37)
MCV RBC AUTO: 89 FL (ref 81–99)
MONOCYTES # BLD AUTO: 0.6 THOUSAND/ΜL (ref 0.17–1.22)
MONOCYTES NFR BLD AUTO: 7 % (ref 2–12)
NEUTROPHILS # BLD AUTO: 5.3 THOUSANDS/ΜL (ref 1.4–6.5)
NEUTS SEG NFR BLD AUTO: 60 % (ref 42–75)
NITRITE UR QL STRIP: NEGATIVE
PH UR STRIP.AUTO: 5.5 [PH]
PLATELET # BLD AUTO: 309 THOUSANDS/UL (ref 149–390)
PMV BLD AUTO: 8.3 FL (ref 8.6–11.7)
POTASSIUM SERPL-SCNC: 3.8 MMOL/L (ref 3.5–5.5)
PROT SERPL-MCNC: 8.1 G/DL (ref 6.4–8.9)
PROT UR STRIP-MCNC: NEGATIVE MG/DL
RBC # BLD AUTO: 4.79 MILLION/UL (ref 3.9–5.2)
SODIUM SERPL-SCNC: 140 MMOL/L (ref 134–143)
SP GR UR STRIP.AUTO: 1.02 (ref 1–1.03)
TRIGL SERPL-MCNC: 117 MG/DL (ref 44–166)
UROBILINOGEN UR QL STRIP.AUTO: 0.2 E.U./DL
WBC # BLD AUTO: 8.9 THOUSAND/UL (ref 4.8–10.8)

## 2020-09-16 PROCEDURE — 80053 COMPREHEN METABOLIC PANEL: CPT

## 2020-09-16 PROCEDURE — 85025 COMPLETE CBC W/AUTO DIFF WBC: CPT

## 2020-09-16 PROCEDURE — 81003 URINALYSIS AUTO W/O SCOPE: CPT

## 2020-09-16 PROCEDURE — 87077 CULTURE AEROBIC IDENTIFY: CPT

## 2020-09-16 PROCEDURE — 84478 ASSAY OF TRIGLYCERIDES: CPT

## 2020-09-16 PROCEDURE — 87181 SC STD AGAR DILUTION PER AGT: CPT

## 2020-09-16 PROCEDURE — 87086 URINE CULTURE/COLONY COUNT: CPT

## 2020-09-16 PROCEDURE — 87186 SC STD MICRODIL/AGAR DIL: CPT

## 2020-09-16 PROCEDURE — 83721 ASSAY OF BLOOD LIPOPROTEIN: CPT

## 2020-09-16 PROCEDURE — 36415 COLL VENOUS BLD VENIPUNCTURE: CPT

## 2020-09-19 LAB
BACTERIA UR CULT: ABNORMAL
BACTERIA UR CULT: ABNORMAL

## 2020-09-21 ENCOUNTER — TRANSCRIBE ORDERS (OUTPATIENT)
Dept: ADMINISTRATIVE | Facility: HOSPITAL | Age: 60
End: 2020-09-21

## 2020-09-21 DIAGNOSIS — Z12.31 ENCOUNTER FOR SCREENING MAMMOGRAM FOR MALIGNANT NEOPLASM OF BREAST: Primary | ICD-10-CM

## 2020-09-21 DIAGNOSIS — Z78.0 POST-MENOPAUSAL: ICD-10-CM

## 2020-10-01 ENCOUNTER — HOSPITAL ENCOUNTER (OUTPATIENT)
Dept: MAMMOGRAPHY | Facility: HOSPITAL | Age: 60
Discharge: HOME | End: 2020-10-01
Attending: SPECIALIST
Payer: COMMERCIAL

## 2020-10-01 ENCOUNTER — HOSPITAL ENCOUNTER (OUTPATIENT)
Dept: BONE DENSITY | Facility: HOSPITAL | Age: 60
Discharge: HOME | End: 2020-10-01
Attending: SPECIALIST
Payer: COMMERCIAL

## 2020-10-01 VITALS — BODY MASS INDEX: 33.31 KG/M2 | WEIGHT: 188 LBS | HEIGHT: 63 IN

## 2020-10-01 DIAGNOSIS — Z12.31 ENCOUNTER FOR SCREENING MAMMOGRAM FOR MALIGNANT NEOPLASM OF BREAST: ICD-10-CM

## 2020-10-01 DIAGNOSIS — Z78.0 POST-MENOPAUSAL: ICD-10-CM

## 2020-10-01 PROCEDURE — 77080 DXA BONE DENSITY AXIAL: CPT

## 2020-10-01 PROCEDURE — 77067 SCR MAMMO BI INCL CAD: CPT

## 2020-10-01 PROCEDURE — 77063 BREAST TOMOSYNTHESIS BI: CPT

## 2021-05-03 ENCOUNTER — TRANSCRIBE ORDERS (OUTPATIENT)
Dept: LAB | Facility: HOSPITAL | Age: 61
End: 2021-05-03

## 2021-05-03 ENCOUNTER — APPOINTMENT (OUTPATIENT)
Dept: LAB | Facility: HOSPITAL | Age: 61
End: 2021-05-03
Payer: COMMERCIAL

## 2021-05-03 DIAGNOSIS — R73.01 IMPAIRED FASTING GLUCOSE: ICD-10-CM

## 2021-05-03 DIAGNOSIS — E78.5 HYPERLIPIDEMIA, UNSPECIFIED HYPERLIPIDEMIA TYPE: Primary | ICD-10-CM

## 2021-05-03 DIAGNOSIS — I10 HYPERTENSION, UNSPECIFIED TYPE: ICD-10-CM

## 2021-05-03 DIAGNOSIS — E78.5 HYPERLIPIDEMIA, UNSPECIFIED HYPERLIPIDEMIA TYPE: ICD-10-CM

## 2021-05-03 LAB
ALBUMIN SERPL BCP-MCNC: 4.4 G/DL (ref 3.5–5.7)
ALP SERPL-CCNC: 57 U/L (ref 40–150)
ALT SERPL W P-5'-P-CCNC: 16 U/L (ref 7–52)
ANION GAP SERPL CALCULATED.3IONS-SCNC: 12 MMOL/L (ref 4–13)
AST SERPL W P-5'-P-CCNC: 15 U/L (ref 13–39)
BACTERIA UR QL AUTO: ABNORMAL /HPF
BILIRUB SERPL-MCNC: 0.5 MG/DL (ref 0.2–1)
BILIRUB UR QL STRIP: NEGATIVE
BUN SERPL-MCNC: 14 MG/DL (ref 7–25)
CALCIUM SERPL-MCNC: 9.2 MG/DL (ref 8.6–10.5)
CHLORIDE SERPL-SCNC: 98 MMOL/L (ref 98–107)
CHOLEST SERPL-MCNC: 197 MG/DL (ref 0–200)
CLARITY UR: CLEAR
CO2 SERPL-SCNC: 27 MMOL/L (ref 21–31)
COLOR UR: YELLOW
CREAT SERPL-MCNC: 0.79 MG/DL (ref 0.6–1.2)
ERYTHROCYTE [DISTWIDTH] IN BLOOD BY AUTOMATED COUNT: 13.6 % (ref 11.5–14.5)
GFR SERPL CREATININE-BSD FRML MDRD: 83 ML/MIN/1.73SQ M
GLUCOSE P FAST SERPL-MCNC: 111 MG/DL (ref 65–99)
GLUCOSE UR STRIP-MCNC: NEGATIVE MG/DL
HCT VFR BLD AUTO: 41.8 % (ref 42–47)
HDLC SERPL-MCNC: 46 MG/DL
HGB BLD-MCNC: 13.5 G/DL (ref 12–16)
HGB UR QL STRIP.AUTO: ABNORMAL
KETONES UR STRIP-MCNC: NEGATIVE MG/DL
LDLC SERPL CALC-MCNC: 87 MG/DL (ref 0–100)
LEUKOCYTE ESTERASE UR QL STRIP: ABNORMAL
MCH RBC QN AUTO: 28.7 PG (ref 26–34)
MCHC RBC AUTO-ENTMCNC: 32.2 G/DL (ref 31–37)
MCV RBC AUTO: 89 FL (ref 81–99)
NITRITE UR QL STRIP: NEGATIVE
NON-SQ EPI CELLS URNS QL MICRO: ABNORMAL /HPF
NONHDLC SERPL-MCNC: 151 MG/DL
PH UR STRIP.AUTO: 6 [PH]
PLATELET # BLD AUTO: 311 THOUSANDS/UL (ref 149–390)
PMV BLD AUTO: 8.6 FL (ref 8.6–11.7)
POTASSIUM SERPL-SCNC: 3.4 MMOL/L (ref 3.5–5.5)
PROT SERPL-MCNC: 7.6 G/DL (ref 6.4–8.9)
PROT UR STRIP-MCNC: NEGATIVE MG/DL
RBC # BLD AUTO: 4.7 MILLION/UL (ref 3.9–5.2)
RBC #/AREA URNS AUTO: ABNORMAL /HPF
SODIUM SERPL-SCNC: 137 MMOL/L (ref 134–143)
SP GR UR STRIP.AUTO: 1.01 (ref 1–1.03)
TRIGL SERPL-MCNC: 318 MG/DL (ref 44–166)
UROBILINOGEN UR QL STRIP.AUTO: 0.2 E.U./DL
WBC # BLD AUTO: 9.1 THOUSAND/UL (ref 4.8–10.8)
WBC #/AREA URNS AUTO: ABNORMAL /HPF

## 2021-05-03 PROCEDURE — 81001 URINALYSIS AUTO W/SCOPE: CPT

## 2021-05-03 PROCEDURE — 80061 LIPID PANEL: CPT

## 2021-05-03 PROCEDURE — 85027 COMPLETE CBC AUTOMATED: CPT

## 2021-05-03 PROCEDURE — 36415 COLL VENOUS BLD VENIPUNCTURE: CPT

## 2021-05-03 PROCEDURE — 80053 COMPREHEN METABOLIC PANEL: CPT

## 2021-05-12 ENCOUNTER — TRANSCRIBE ORDERS (OUTPATIENT)
Dept: LAB | Facility: HOSPITAL | Age: 61
End: 2021-05-12

## 2021-05-12 ENCOUNTER — APPOINTMENT (OUTPATIENT)
Dept: LAB | Facility: HOSPITAL | Age: 61
End: 2021-05-12
Payer: COMMERCIAL

## 2021-05-12 DIAGNOSIS — R53.83 FATIGUE, UNSPECIFIED TYPE: ICD-10-CM

## 2021-05-12 DIAGNOSIS — R73.01 IMPAIRED FASTING GLUCOSE: ICD-10-CM

## 2021-05-12 DIAGNOSIS — E87.6 HYPOPOTASSEMIA: ICD-10-CM

## 2021-05-12 DIAGNOSIS — R73.01 IMPAIRED FASTING GLUCOSE: Primary | ICD-10-CM

## 2021-05-12 LAB
ANION GAP SERPL CALCULATED.3IONS-SCNC: 9 MMOL/L (ref 4–13)
BUN SERPL-MCNC: 14 MG/DL (ref 7–25)
CALCIUM SERPL-MCNC: 9.1 MG/DL (ref 8.6–10.5)
CHLORIDE SERPL-SCNC: 105 MMOL/L (ref 98–107)
CO2 SERPL-SCNC: 25 MMOL/L (ref 21–31)
CREAT SERPL-MCNC: 0.63 MG/DL (ref 0.6–1.2)
EST. AVERAGE GLUCOSE BLD GHB EST-MCNC: 114 MG/DL
GFR SERPL CREATININE-BSD FRML MDRD: 99 ML/MIN/1.73SQ M
GLUCOSE P FAST SERPL-MCNC: 112 MG/DL (ref 65–99)
HBA1C MFR BLD: 5.6 %
POTASSIUM SERPL-SCNC: 3.8 MMOL/L (ref 3.5–5.5)
SODIUM SERPL-SCNC: 139 MMOL/L (ref 134–143)
TSH SERPL DL<=0.05 MIU/L-ACNC: 1.94 UIU/ML (ref 0.45–5.33)

## 2021-05-12 PROCEDURE — 84443 ASSAY THYROID STIM HORMONE: CPT

## 2021-05-12 PROCEDURE — 80048 BASIC METABOLIC PNL TOTAL CA: CPT

## 2021-05-12 PROCEDURE — 83036 HEMOGLOBIN GLYCOSYLATED A1C: CPT

## 2021-05-12 PROCEDURE — 36415 COLL VENOUS BLD VENIPUNCTURE: CPT

## 2021-10-07 ENCOUNTER — OFFICE VISIT (OUTPATIENT)
Dept: URGENT CARE | Facility: CLINIC | Age: 61
End: 2021-10-07
Payer: COMMERCIAL

## 2021-10-07 VITALS — TEMPERATURE: 97.8 F | OXYGEN SATURATION: 97 % | HEART RATE: 56 BPM | RESPIRATION RATE: 18 BRPM

## 2021-10-07 DIAGNOSIS — B34.9 VIRAL INFECTION: ICD-10-CM

## 2021-10-07 DIAGNOSIS — Z20.822 EXPOSURE TO CONFIRMED CASE OF COVID-19: ICD-10-CM

## 2021-10-07 DIAGNOSIS — Z20.822 SUSPECTED COVID-19 VIRUS INFECTION: Primary | ICD-10-CM

## 2021-10-07 PROCEDURE — 99213 OFFICE O/P EST LOW 20 MIN: CPT | Performed by: NURSE PRACTITIONER

## 2021-10-07 PROCEDURE — U0003 INFECTIOUS AGENT DETECTION BY NUCLEIC ACID (DNA OR RNA); SEVERE ACUTE RESPIRATORY SYNDROME CORONAVIRUS 2 (SARS-COV-2) (CORONAVIRUS DISEASE [COVID-19]), AMPLIFIED PROBE TECHNIQUE, MAKING USE OF HIGH THROUGHPUT TECHNOLOGIES AS DESCRIBED BY CMS-2020-01-R: HCPCS | Performed by: NURSE PRACTITIONER

## 2021-10-07 PROCEDURE — U0005 INFEC AGEN DETEC AMPLI PROBE: HCPCS | Performed by: NURSE PRACTITIONER

## 2021-10-08 LAB — SARS-COV-2 RNA RESP QL NAA+PROBE: POSITIVE

## 2021-11-11 ENCOUNTER — APPOINTMENT (OUTPATIENT)
Dept: RADIOLOGY | Facility: CLINIC | Age: 61
End: 2021-11-11
Payer: COMMERCIAL

## 2021-11-11 ENCOUNTER — OFFICE VISIT (OUTPATIENT)
Dept: URGENT CARE | Facility: CLINIC | Age: 61
End: 2021-11-11
Payer: COMMERCIAL

## 2021-11-11 VITALS
SYSTOLIC BLOOD PRESSURE: 140 MMHG | HEART RATE: 93 BPM | TEMPERATURE: 97.8 F | RESPIRATION RATE: 18 BRPM | OXYGEN SATURATION: 98 % | DIASTOLIC BLOOD PRESSURE: 69 MMHG

## 2021-11-11 DIAGNOSIS — S92.354A CLOSED NONDISPLACED FRACTURE OF FIFTH METATARSAL BONE OF RIGHT FOOT, INITIAL ENCOUNTER: Primary | ICD-10-CM

## 2021-11-11 DIAGNOSIS — S99.921A INJURY OF RIGHT FOOT, INITIAL ENCOUNTER: ICD-10-CM

## 2021-11-11 PROCEDURE — 73630 X-RAY EXAM OF FOOT: CPT

## 2021-11-11 PROCEDURE — 73610 X-RAY EXAM OF ANKLE: CPT

## 2021-11-11 PROCEDURE — 99214 OFFICE O/P EST MOD 30 MIN: CPT | Performed by: PHYSICIAN ASSISTANT

## 2021-11-12 ENCOUNTER — TELEPHONE (OUTPATIENT)
Dept: OBGYN CLINIC | Facility: HOSPITAL | Age: 61
End: 2021-11-12

## 2021-11-17 ENCOUNTER — OFFICE VISIT (OUTPATIENT)
Dept: OBGYN CLINIC | Facility: CLINIC | Age: 61
End: 2021-11-17
Payer: COMMERCIAL

## 2021-11-17 VITALS
HEART RATE: 90 BPM | SYSTOLIC BLOOD PRESSURE: 145 MMHG | WEIGHT: 188 LBS | DIASTOLIC BLOOD PRESSURE: 81 MMHG | HEIGHT: 63 IN | BODY MASS INDEX: 33.31 KG/M2

## 2021-11-17 DIAGNOSIS — S92.354A CLOSED NONDISPLACED FRACTURE OF FIFTH METATARSAL BONE OF RIGHT FOOT, INITIAL ENCOUNTER: Primary | ICD-10-CM

## 2021-11-17 PROCEDURE — 99213 OFFICE O/P EST LOW 20 MIN: CPT | Performed by: ORTHOPAEDIC SURGERY

## 2021-11-17 RX ORDER — MOMETASONE FUROATE 1 MG/G
OINTMENT TOPICAL
COMMUNITY
Start: 2021-09-15

## 2021-12-15 ENCOUNTER — OFFICE VISIT (OUTPATIENT)
Dept: OBGYN CLINIC | Facility: CLINIC | Age: 61
End: 2021-12-15
Payer: COMMERCIAL

## 2021-12-15 ENCOUNTER — APPOINTMENT (OUTPATIENT)
Dept: RADIOLOGY | Facility: CLINIC | Age: 61
End: 2021-12-15
Payer: COMMERCIAL

## 2021-12-15 VITALS
DIASTOLIC BLOOD PRESSURE: 89 MMHG | HEIGHT: 63 IN | WEIGHT: 188 LBS | HEART RATE: 49 BPM | SYSTOLIC BLOOD PRESSURE: 131 MMHG | BODY MASS INDEX: 33.31 KG/M2

## 2021-12-15 DIAGNOSIS — S92.354D CLOSED NONDISPLACED FRACTURE OF FIFTH METATARSAL BONE OF RIGHT FOOT WITH ROUTINE HEALING, SUBSEQUENT ENCOUNTER: ICD-10-CM

## 2021-12-15 DIAGNOSIS — S92.354D CLOSED NONDISPLACED FRACTURE OF FIFTH METATARSAL BONE OF RIGHT FOOT WITH ROUTINE HEALING, SUBSEQUENT ENCOUNTER: Primary | ICD-10-CM

## 2021-12-15 PROCEDURE — 99213 OFFICE O/P EST LOW 20 MIN: CPT | Performed by: ORTHOPAEDIC SURGERY

## 2021-12-15 PROCEDURE — 73630 X-RAY EXAM OF FOOT: CPT

## 2022-01-19 ENCOUNTER — APPOINTMENT (OUTPATIENT)
Dept: RADIOLOGY | Facility: CLINIC | Age: 62
End: 2022-01-19
Payer: COMMERCIAL

## 2022-01-19 ENCOUNTER — OFFICE VISIT (OUTPATIENT)
Dept: OBGYN CLINIC | Facility: CLINIC | Age: 62
End: 2022-01-19
Payer: COMMERCIAL

## 2022-01-19 VITALS — WEIGHT: 188 LBS | BODY MASS INDEX: 33.31 KG/M2 | HEIGHT: 63 IN

## 2022-01-19 DIAGNOSIS — S92.354D CLOSED NONDISPLACED FRACTURE OF FIFTH METATARSAL BONE OF RIGHT FOOT WITH ROUTINE HEALING, SUBSEQUENT ENCOUNTER: ICD-10-CM

## 2022-01-19 DIAGNOSIS — S92.354D CLOSED NONDISPLACED FRACTURE OF FIFTH METATARSAL BONE OF RIGHT FOOT WITH ROUTINE HEALING, SUBSEQUENT ENCOUNTER: Primary | ICD-10-CM

## 2022-01-19 PROCEDURE — 73630 X-RAY EXAM OF FOOT: CPT

## 2022-01-19 PROCEDURE — 99213 OFFICE O/P EST LOW 20 MIN: CPT | Performed by: ORTHOPAEDIC SURGERY

## 2022-01-19 NOTE — PROGRESS NOTES
Chief Complaint  Right foot pain    History Of Presenting Illness  Duarte Carlson  presents with right foot pain due to fracture of the right 5th metatarsal shaft sustained 2021  Patient presents for evaluation of x-rays  Right foot is asymptomatic       Current Medications  Current Outpatient Medications   Medication Sig Dispense Refill    amLODIPine (NORVASC) 5 mg tablet Take 5 mg by mouth daily      esomeprazole (NEXIUM) 20 mg capsule Take 20 mg by mouth daily      mometasone (ELOCON) 0 1 % ointment        No current facility-administered medications for this visit         Current Problems    Active Problems:   Patient Active Problem List    Diagnosis Date Noted    Ganglion cyst of flexor tendon sheath of finger of right hand 2019         Review of Systems:    General: negative for - chills, fatigue, fever,  weight gain or weight loss  Psychological: negative for - anxiety, behavioral disorder, concentration difficulties  Ophthalmic: negative for - blurry vision, decreased vision, double vision,      Past Medical History:   Past Medical History:   Diagnosis Date    Anxiety     Depression     GERD (gastroesophageal reflux disease)     Hypertension        Past Surgical History:   Past Surgical History:   Procedure Laterality Date     SECTION      CHOLECYSTECTOMY         Family History:  Family history reviewed and non-contributory  Family History   Problem Relation Age of Onset    Hypertension Mother     Transient ischemic attack Mother     Throat cancer Maternal Grandmother     No Known Problems Father     No Known Problems Sister     No Known Problems Daughter     Breast cancer Maternal Aunt     No Known Problems Paternal Aunt     No Known Problems Paternal Aunt     Cancer Paternal Aunt     No Known Problems Paternal [de-identified]     Breast cancer Paternal Aunt        Social History:  Social History     Socioeconomic History    Marital status: /Civil Union     Spouse name: None    Number of children: None    Years of education: None    Highest education level: None   Occupational History    None   Tobacco Use    Smoking status: Former Smoker     Quit date: 11/1/2018     Years since quitting: 3 2    Smokeless tobacco: Never Used   Vaping Use    Vaping Use: Never used   Substance and Sexual Activity    Alcohol use: Not Currently    Drug use: Never    Sexual activity: None   Other Topics Concern    None   Social History Narrative    None     Social Determinants of Health     Financial Resource Strain: Not on file   Food Insecurity: Not on file   Transportation Needs: Not on file   Physical Activity: Not on file   Stress: Not on file   Social Connections: Not on file   Intimate Partner Violence: Not on file   Housing Stability: Not on file       Allergies:   No Known Allergies        Physical ExaminationHt 5' 3" (1 6 m)   Wt 85 3 kg (188 lb)   BMI 33 30 kg/m²   Gen: Alert and oriented to person, place, time  HEENT: EOMI, eyes clear, moist mucus membranes, hearing intact      Orthopedic Exam    Bilateral mild pedal edema right foot no swelling or tenderness excellent range of pain-free motion radiographs satisfactory          Impression  Healing right 5th metatarsal shaft fracture            Plan    Patient allowed all activities as tolerated  Patient will discuss with the PCP bilateral pedal edema and the need for possible adjustment of her hypertensive medication  Follow-up isiah hancock n  Oscar Tran MD        Portions of the record may have been created with voice recognition software  Occasional wrong word or "sound a like" substitutions may have occurred due to the inherent limitations of voice recognition software  Read the chart carefully and recognize, using context, where substitutions have occurred

## 2022-05-13 ENCOUNTER — APPOINTMENT (EMERGENCY)
Dept: RADIOLOGY | Facility: HOSPITAL | Age: 62
End: 2022-05-13
Payer: COMMERCIAL

## 2022-05-13 ENCOUNTER — APPOINTMENT (EMERGENCY)
Dept: CT IMAGING | Facility: HOSPITAL | Age: 62
End: 2022-05-13
Payer: COMMERCIAL

## 2022-05-13 ENCOUNTER — HOSPITAL ENCOUNTER (EMERGENCY)
Facility: HOSPITAL | Age: 62
Discharge: HOME | End: 2022-05-13
Attending: EMERGENCY MEDICINE
Payer: COMMERCIAL

## 2022-05-13 VITALS
DIASTOLIC BLOOD PRESSURE: 69 MMHG | RESPIRATION RATE: 18 BRPM | TEMPERATURE: 98 F | OXYGEN SATURATION: 96 % | HEART RATE: 89 BPM | SYSTOLIC BLOOD PRESSURE: 148 MMHG
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
## 2022-05-13 DIAGNOSIS — S06.0X9A CONCUSSION: ICD-10-CM

## 2022-05-13 DIAGNOSIS — T14.8XXA SKIN ABRASION: ICD-10-CM

## 2022-05-13 PROCEDURE — 71250 CT THORAX DX C-: CPT

## 2022-05-13 PROCEDURE — 90715 TDAP VACCINE 7 YRS/> IM: CPT | Performed by: PHYSICIAN ASSISTANT

## 2022-05-13 PROCEDURE — 71045 X-RAY EXAM CHEST 1 VIEW: CPT

## 2022-05-13 PROCEDURE — 70450 CT HEAD/BRAIN W/O DYE: CPT

## 2022-05-13 PROCEDURE — 90471 IMMUNIZATION ADMIN: CPT

## 2022-05-13 PROCEDURE — 99284 EMERGENCY DEPT VISIT MOD MDM: CPT

## 2022-05-13 PROCEDURE — 99284 EMERGENCY DEPT VISIT MOD MDM: CPT | Performed by: PHYSICIAN ASSISTANT

## 2022-05-13 RX ORDER — GINSENG 100 MG
1 CAPSULE ORAL ONCE
Status: COMPLETED | OUTPATIENT
Start: 2022-05-13 | End: 2022-05-13

## 2022-05-13 RX ORDER — ACETAMINOPHEN 325 MG/1
975 TABLET ORAL ONCE
Status: COMPLETED | OUTPATIENT
Start: 2022-05-13 | End: 2022-05-13

## 2022-05-13 RX ADMIN — BACITRACIN ZINC 1 SMALL APPLICATION: 500 OINTMENT TOPICAL at 15:13

## 2022-05-13 RX ADMIN — ACETAMINOPHEN 975 MG: 325 TABLET ORAL at 14:13

## 2022-05-13 RX ADMIN — TETANUS TOXOID, REDUCED DIPHTHERIA TOXOID AND ACELLULAR PERTUSSIS VACCINE, ADSORBED 0.5 ML: 5; 2.5; 8; 8; 2.5 SUSPENSION INTRAMUSCULAR at 14:13
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
## 2022-05-13 NOTE — DISCHARGE INSTRUCTIONS
Take 1000mg of tylenol up to 3 times daily as needed for pain    The following findings require follow up:  Radiographic finding   Finding: Dependent groundglass densities, particularly in the right lower lobe, also seen on the prior study  While atelectasis can have this appearance, there now appears to be mild associated bronchiectasis best visualized in the right lower lobe on   sagittal images and honeycombing on coronal images  This raises the possibility of usual interstitial pneumonia and if there is clinical concern, follow-up high resolution chest CT is recommended  3   Upper lobe paraseptal emphysema     Follow up required: PCP   Follow up should be done within 1 week(s)

## 2022-07-05 ENCOUNTER — OFFICE VISIT (OUTPATIENT)
Dept: URGENT CARE | Facility: CLINIC | Age: 62
End: 2022-07-05
Payer: COMMERCIAL

## 2022-07-05 ENCOUNTER — APPOINTMENT (OUTPATIENT)
Dept: RADIOLOGY | Facility: CLINIC | Age: 62
End: 2022-07-05
Payer: COMMERCIAL

## 2022-07-05 VITALS
WEIGHT: 188 LBS | TEMPERATURE: 97.7 F | BODY MASS INDEX: 33.3 KG/M2 | HEART RATE: 71 BPM | RESPIRATION RATE: 18 BRPM | OXYGEN SATURATION: 99 %

## 2022-07-05 DIAGNOSIS — M25.531 RIGHT WRIST PAIN: ICD-10-CM

## 2022-07-05 DIAGNOSIS — S60.211A CONTUSION OF RIGHT WRIST, INITIAL ENCOUNTER: Primary | ICD-10-CM

## 2022-07-05 PROCEDURE — 99213 OFFICE O/P EST LOW 20 MIN: CPT | Performed by: PHYSICIAN ASSISTANT

## 2022-07-05 PROCEDURE — 73110 X-RAY EXAM OF WRIST: CPT

## 2022-07-05 NOTE — PROGRESS NOTES
[facility]        NAME: Ara Hameed is a 63 y o  female  : 1960    MRN: [id number]  DATE: 2022  TIME: 7:54 PM    Assessment and Plan   Right wrist pain [M25 531]  1  Right wrist pain  XR wrist 3+ vw right         Patient Instructions       Follow up with PCP in 3-5 days  Proceed to  ER if symptoms worsen  Chief Complaint     Chief Complaint   Patient presents with    Wrist Pain     Right x 2 days         History of Present Illness       Patient is a 64 y/o/f presenting [facility] with right wrist pain s/p fall  Patient reports falling two days ago while climbing down set of pool steps  Pt reports striking right wrist and left side of eye on handle  No LOC  Since the fall Pt denies any dizziness, blurred/double vision, headaches  Right wrist is persistent pain and swelling       Wrist Pain   The pain is present in the right wrist  This is a new problem  The current episode started in the past 7 days  There has been a history of trauma  The problem occurs constantly  The problem has been gradually worsening  The quality of the pain is described as aching  The pain is moderate  Pertinent negatives include no fever  Review of Systems   Review of Systems   Constitutional: Negative for chills and fever  HENT: Negative for ear pain and sore throat  Eyes: Negative for pain and visual disturbance  Respiratory: Negative for cough and shortness of breath  Cardiovascular: Negative for chest pain and palpitations  Gastrointestinal: Negative for abdominal pain and vomiting  Genitourinary: Negative for dysuria and hematuria  Musculoskeletal: Positive for arthralgias (Right wrist pain and swelling )  Negative for back pain  Skin: Negative for color change and rash  Neurological: Negative for seizures and syncope  All other systems reviewed and are negative          Current Medications       Current Outpatient Medications:     amLODIPine (NORVASC) 5 mg tablet, Take 5 mg by mouth daily, Disp: , Rfl:     esomeprazole (NexIUM) 20 mg capsule, Take 20 mg by mouth daily, Disp: , Rfl:     mometasone (ELOCON) 0 1 % ointment, , Disp: , Rfl:     Current Allergies     Allergies as of 2022    (No Known Allergies)            The following portions of the patient's history were reviewed and updated as appropriate: allergies, current medications, past family history, past medical history, past social history, past surgical history and problem list      Past Medical History:   Diagnosis Date    Anxiety     Depression     GERD (gastroesophageal reflux disease)     Hypertension        Past Surgical History:   Procedure Laterality Date     SECTION      CHOLECYSTECTOMY         Family History   Problem Relation Age of Onset    Hypertension Mother     Transient ischemic attack Mother     Throat cancer Maternal Grandmother     No Known Problems Father     No Known Problems Sister     No Known Problems Daughter     Breast cancer Maternal Aunt     No Known Problems Paternal Aunt     No Known Problems Paternal Aunt     Cancer Paternal Aunt     No Known Problems Paternal Aunt     Breast cancer Paternal Aunt          Medications have been verified  Objective   Pulse 71   Temp 97 7 °F (36 5 °C)   Resp 18   Wt 85 3 kg (188 lb)   SpO2 99%   BMI 33 30 kg/m²   No LMP recorded  Patient is postmenopausal        Physical Exam     Physical Exam  Constitutional:       Appearance: Normal appearance  HENT:      Head: Normocephalic and atraumatic  Nose: Nose normal       Mouth/Throat:      Mouth: Mucous membranes are moist    Eyes:      Extraocular Movements: Extraocular movements intact  Conjunctiva/sclera: Conjunctivae normal       Pupils: Pupils are equal, round, and reactive to light  Cardiovascular:      Rate and Rhythm: Normal rate     Pulmonary:      Effort: Pulmonary effort is normal    Musculoskeletal:         General: Normal range of motion  Arms:       Cervical back: Normal range of motion and neck supple  Skin:     General: Skin is warm and dry  Capillary Refill: Capillary refill takes less than 2 seconds  Neurological:      General: No focal deficit present  Mental Status: She is alert and oriented to person, place, and time     Psychiatric:         Mood and Affect: Mood normal          Behavior: Behavior normal

## 2022-12-20 ENCOUNTER — HOSPITAL ENCOUNTER (OUTPATIENT)
Dept: MAMMOGRAPHY | Facility: HOSPITAL | Age: 62
Discharge: HOME | End: 2022-12-20
Attending: INTERNAL MEDICINE

## 2022-12-20 ENCOUNTER — HOSPITAL ENCOUNTER (OUTPATIENT)
Dept: ULTRASOUND IMAGING | Facility: HOSPITAL | Age: 62
Discharge: HOME | End: 2022-12-20
Attending: INTERNAL MEDICINE

## 2022-12-20 VITALS — HEIGHT: 63 IN | WEIGHT: 157 LBS | BODY MASS INDEX: 27.82 KG/M2

## 2022-12-20 DIAGNOSIS — N63.10 MASS OF RIGHT BREAST, UNSPECIFIED QUADRANT: ICD-10-CM

## 2022-12-20 NOTE — PROGRESS NOTES
Call placed to patient regarding recommendation for;    __X___ RIGHT ______LEFT      __X___Ultrasound guided  ______Stereotactic breast biopsy  Pt states that procedure was explained to her, additional questions answered at this time    __X___Verbalized understanding        Blood thinners: No: _____ Yes: _____ What:     Biopsy teaching sheet given:  _____yes __X__no (All teaching points discussed during call, pt with no questions at this time, pt adv to arrive at 0730 for 0800 biopsy)    Pt given name/# for any further questions/needs    Pt agreeable to a post procedure call and states we can give her biopsy results to her over the phone

## 2022-12-27 ENCOUNTER — HOSPITAL ENCOUNTER (OUTPATIENT)
Dept: ULTRASOUND IMAGING | Facility: HOSPITAL | Age: 62
Discharge: HOME | End: 2022-12-27

## 2022-12-27 ENCOUNTER — HOSPITAL ENCOUNTER (OUTPATIENT)
Dept: MAMMOGRAPHY | Facility: HOSPITAL | Age: 62
Discharge: HOME | End: 2022-12-27

## 2022-12-27 VITALS — HEART RATE: 72 BPM | DIASTOLIC BLOOD PRESSURE: 83 MMHG | SYSTOLIC BLOOD PRESSURE: 136 MMHG

## 2022-12-27 DIAGNOSIS — N63.10 MASS OF RIGHT BREAST, UNSPECIFIED QUADRANT: ICD-10-CM

## 2022-12-27 RX ORDER — LIDOCAINE HYDROCHLORIDE 10 MG/ML
5 INJECTION, SOLUTION EPIDURAL; INFILTRATION; INTRACAUDAL; PERINEURAL ONCE
Status: DISCONTINUED | OUTPATIENT
Start: 2022-12-27 | End: 2022-12-31 | Stop reason: HOSPADM

## 2022-12-27 NOTE — PROGRESS NOTES
Procedure type:    __X___ultrasound guided _____stereotactic    Breast:    _____Left __X___Right    Location: right breast 2 oclock 3CMFN    Needle: 12 G Kerry    # of passes: 3 passes    Clip: Heart    Performed by: Dr Jason Gonzalez held for 5 minutes by: Sayda Ellington RN     Steri Strips:    ___X__yes _____no    Band aid:    __X___yes_____no    Tolerated procedure:    __X___yes _____no

## 2022-12-27 NOTE — DISCHARGE INSTR - OTHER ORDERS
POST LARGE CORE BREAST BIOPSY PATIENT INFORMATION      Place an ice pack inside your bra over the top of the dressing every hour for 20 minutes (20 minutes on, 60 minutes off)  Do this until bedtime  Do not shower or bathe until the following morning  You may bathe your breast carefully with the steri-strips in place  Be careful    Not to loosen them  The steri-strips will fall off in 3-5 days  You may have mild discomfort, and you may have some bruising where the   Needle entered the skin  This should clear within 5-7 days  If you need medicine for discomfort, take acetaminophen products such as   Tylenol  You may also take Advil or Motrin products  Do not participate in strenuous activities such as-tennis, aerobics, skiing,  Weight lifting, etc  for 24 hours  Refrain from swimming/soaking for 72 hours  Wearing a bra for sleeping may be more comfortable for the first 24-48 hours  Watch for continued bleeding, pain or fever over 101  If any of these symptoms occur, please contact our    breast nurse navigator at the location where your biopsy was performed  During normal business hours (7:30 am-4:00 pm) please call the nurse navigator at the site where your   procedure was performed:    [facility] [address]: [phone] or   [address]: [phone] or [phone]  [address]: [address] [facility]: [address] [place] [address] Street: [phone]              After 4 PM - please call your physician or go to the nearest Emergency Department location  9          The final results of your biopsy are usually available within one week

## 2022-12-27 NOTE — PROGRESS NOTES
Ice pack given:    __X___yes _____no    Discharge instructions signed by patient:    __X___yes _____no    Discharge instructions given to patient:    __X___yes _____no    Discharged via:    __X___amulatory    _____wheelchair    _____stretcher    Stable on discharge:    __X___yes ____no    Post procedure site check complete without any additional bleeding noted  Pt denies any pain upon discharge

## 2022-12-28 NOTE — PROGRESS NOTES
Post procedure call completed    Bleeding: _____yes __X___no (pt denies)    Pain: _____yes ___X___no (pt with tenderness, used ice, took Tylenol x1)    Redness/Swelling: ______yes ___X___no (pt denies)    Band aid removed: ___X__yes _____no    Steri-Strips intact: ___X___yes _____no (discussed with patient to remove steri strips on Sunday if they have not come off on their own)    Pt with no questions at this time, adv will call when results available, adv to call with any questions or concerns, has name/# for contact

## 2022-12-30 ENCOUNTER — TELEPHONE (OUTPATIENT)
Dept: MAMMOGRAPHY | Facility: CLINIC | Age: 62
End: 2022-12-30

## 2023-03-21 ENCOUNTER — HOSPITAL ENCOUNTER (OUTPATIENT)
Dept: ULTRASOUND IMAGING | Facility: HOSPITAL | Age: 63
Discharge: HOME | End: 2023-03-21

## 2023-03-21 DIAGNOSIS — R92.8 ABNORMAL MAMMOGRAM: ICD-10-CM

## 2023-06-08 ENCOUNTER — HOSPITAL ENCOUNTER (OUTPATIENT)
Dept: ULTRASOUND IMAGING | Facility: HOSPITAL | Age: 63
Discharge: HOME | End: 2023-06-08
Payer: COMMERCIAL

## 2023-06-08 DIAGNOSIS — R92.8 ABNORMAL MAMMOGRAM: ICD-10-CM

## 2023-06-08 PROCEDURE — 76642 ULTRASOUND BREAST LIMITED: CPT

## 2024-01-14 ENCOUNTER — OFFICE VISIT (OUTPATIENT)
Dept: URGENT CARE | Facility: CLINIC | Age: 64
End: 2024-01-14
Payer: COMMERCIAL

## 2024-01-14 VITALS
HEART RATE: 82 BPM | DIASTOLIC BLOOD PRESSURE: 75 MMHG | SYSTOLIC BLOOD PRESSURE: 160 MMHG | TEMPERATURE: 98.7 F | OXYGEN SATURATION: 98 % | RESPIRATION RATE: 18 BRPM

## 2024-01-14 DIAGNOSIS — J01.40 ACUTE NON-RECURRENT PANSINUSITIS: Primary | ICD-10-CM

## 2024-01-14 DIAGNOSIS — H10.31 ACUTE BACTERIAL CONJUNCTIVITIS OF RIGHT EYE: ICD-10-CM

## 2024-01-14 DIAGNOSIS — H65.112 ACUTE MUCOID OTITIS MEDIA OF LEFT EAR: ICD-10-CM

## 2024-01-14 PROCEDURE — 99213 OFFICE O/P EST LOW 20 MIN: CPT | Performed by: NURSE PRACTITIONER

## 2024-01-14 RX ORDER — TOBRAMYCIN 3 MG/ML
1 SOLUTION/ DROPS OPHTHALMIC
Qty: 5 ML | Refills: 1 | Status: SHIPPED | OUTPATIENT
Start: 2024-01-14

## 2024-01-14 RX ORDER — AMOXICILLIN 875 MG/1
875 TABLET, COATED ORAL 2 TIMES DAILY
Qty: 20 TABLET | Refills: 0 | Status: SHIPPED | OUTPATIENT
Start: 2024-01-14 | End: 2024-01-24

## 2024-01-14 NOTE — PROGRESS NOTES
[facility]        NAME: Gardenia Hunter is a 63 y.o. female  : 1960    MRN: [id number]  DATE: 2024  TIME: 10:08 AM      Assessment and Plan     Acute non-recurrent pansinusitis [J01.40]  1. Acute non-recurrent pansinusitis  amoxicillin (AMOXIL) 875 mg tablet      2. Acute mucoid otitis media of left ear  amoxicillin (AMOXIL) 875 mg tablet      3. Acute bacterial conjunctivitis of right eye  tobramycin (TOBREX) 0.3 % SOLN            Patient Instructions     Patient Instructions   Change your pillow case tonight and tomorrow night.  Use a clean cloth each time you wipe your eye.  Practice careful handwashing, especially for the first 24 hours of treatment.  If the other eye becomes symptomatic, you may use the same drops the same way for that eye as well.  For the first 24 hours, it is alright to use the drops every 2 hours while awake; after that resume the every 4 hours while awake frequency.  Conjunctivitis   AMBULATORY CARE:   Conjunctivitis , or pink eye, is inflammation of your conjunctiva. The conjunctiva is a thin tissue that covers the front of your eye and the back of your eyelid. The conjunctiva helps protect your eye and keep it moist. The most common cause of conjunctivitis is infection with bacteria or a virus. Allergies or exposure to a chemical may also cause conjunctivitis. Conjunctivitis is easily spread from person to person.       Common signs or symptoms:  You may have symptoms in one or both eyes. You may also have other general symptoms, including a sore throat, runny nose, or fever. You may have any of the following:  Redness in the whites of your eye    Itching in or around your eye    Feeling like there is something in your eye    Watery or thick, sticky discharge    Crusty eyelids when you wake up in the morning    Burning, stinging, or swelling in your eye    Pain when you see bright light    Seek care immediately if:   You have worsening eye  pain.    The swelling in your eye gets worse, even after treatment.    Your vision suddenly becomes worse, or you cannot see at all.    Call your doctor if:   Your start to notice changes in your vision.    You develop a fever and ear pain.    You have tiny bumps or spots of blood on your eye.    You have questions or concerns about your condition or care.    Treatment:  Your conjunctivitis may go away on its own. Treatment depends on what is causing your conjunctivitis. You may need any of the following:  Allergy medicine  helps decrease itchy, red, swollen eyes caused by allergies. It may be given as a pill, eye drops, or nasal spray.    Antibiotics  may be needed if your conjunctivitis is caused by bacteria. This medicine may be given as a pill, eye drops, or eye ointment.    Manage your symptoms:   Apply a cool compress.  Wet a washcloth with cold water and place it on your eye. This will help decrease itching and irritation.    Use artificial tears.  This will help lessen your symptoms, including itching or irritation.    Do not wear contact lenses  until treatment is complete and your symptoms are gone.    Flush your eye.  You may need to flush your eye with saline to help decrease your symptoms. Ask for more information on how to flush your eye.    Prevent the spread of conjunctivitis:   Wash your hands with soap and water often.  Wash your hands before and after you touch your eyes. Wash your hands after you use the bathroom, change a child's diaper, or sneeze. Wash your hands before you prepare or eat food.         Avoid contact with others.  Do not share towels or washcloths. Try to stay away from others as much as possible. Ask when you can return to work or school.    Avoid allergens and irritants.  Try to avoid the things that cause your allergies, such as pets, dust, or grass. Stay away from smoke filled areas. Shield your eyes from wind and sun.    Throw away eye makeup.  Bacteria can stay in eye  makeup. Throw away your current mascara and other eye makeup. Never share mascara or other eye makeup with anyone.    Follow up with your doctor as directed:  You may be referred to an ophthalmologist for treatment. Write down your questions so you remember to ask them during your visits.  © Copyright [employer] 2023 Information is for End User's use only and may not be sold, redistributed or otherwise used for commercial purposes.  The above information is an  only. It is not intended as medical advice for individual conditions or treatments. Talk to your doctor, nurse or pharmacist before following any medical regimen to see if it is safe and effective for you.    Ear Infection   AMBULATORY CARE:   An ear infection  is also called otitis media. Blocked or swollen eustachian tubes can cause an infection. Eustachian tubes connect the middle ear to the back of the nose and throat. They drain fluid from the middle ear. You may have a buildup of fluid in your ear. Germs build up in the fluid and infection develops.       Common signs and symptoms:   Ear pain    Fever or a headache    Trouble hearing    Ringing or buzzing in your ear    Plugged ear or an ear that feels full    Dizziness    Nausea or vomiting    Seek care immediately if:   You have clear fluid coming from your ear.    You have a stiff neck, headache, and a fever.    Call your doctor if:   You see blood or pus draining from your ear.    Your ear pain gets worse or does not go away, even after treatment.    The outside of your ear is red or swollen.    You are vomiting or have diarrhea.    You have questions or concerns about your condition or care.    Medicines:  You may  need any of the following:  Acetaminophen  decreases pain and fever. It is available without a doctor's order. Ask how much to take and how often to take it. Follow directions. Read the labels of all other medicines you are using to see if they also contain acetaminophen, or  ask your doctor or pharmacist. Acetaminophen can cause liver damage if not taken correctly.    NSAIDs , such as ibuprofen, help decrease swelling, pain, and fever. This medicine is available with or without a doctor's order. NSAIDs can cause stomach bleeding or kidney problems in certain people. If you take blood thinner medicine, always ask your healthcare provider if NSAIDs are safe for you. Always read the medicine label and follow directions.    Ear drops  may contain medicine to decrease pain and inflammation.    Antibiotics  help treat a bacterial infection.    Self-care:   Apply heat  on your ear for 15 to 20 minutes, 3 to 4 times a day or as directed. You can apply heat with an electric heating pad, hot water bottle, or warm compress. Always put a cloth between your skin and the heat pack to prevent burns. Heat helps decrease pain.    Apply ice  on your ear for 15 to 20 minutes, 3 to 4 times a day for 2 days or as directed. Use an ice pack, or put crushed ice in a plastic bag. Cover it with a towel before you apply it to your ear. Ice decreases swelling and pain.    Prevent an ear infection:   Wash your hands often  to help prevent the spread of germs. Ask everyone in your house to wash their hands with soap and water. Ask them to wash after they use the bathroom or change a diaper. Remind them to wash before they prepare or eat food.         Stay away from people who are ill.  Some germs spread easily and quickly through contact.    Follow up with your doctor as directed:  Write down your questions so you remember to ask them during your visits.  © Copyright [employer] 2023 Information is for End User's use only and may not be sold, redistributed or otherwise used for commercial purposes.  The above information is an  only. It is not intended as medical advice for individual conditions or treatments. Talk to your doctor, nurse or pharmacist before following any medical regimen to see if it is  safe and effective for you.      Follow up with PCP in 3-5 days.  Proceed to  ER if symptoms worsen.    Chief Complaint     Chief Complaint   Patient presents with    Cold Like Symptoms     X1  week coughing in the am and nasal congestion, both ears blocked yesterday, low grade temp last night, 100.1    Eye Problem     Discharge starting yesterday          History of Present Illness     Nasal congestion and sinus pressure x 1 week getting worse not better.  Onset of stabbing left ear pain last night.  Onset of right eye irritation yesterday with purulent drainage today.  Notes 3 close contacts have recently had pink eye (including 2 year old grandson).        Review of Systems     Review of Systems   HENT:  Positive for congestion, ear pain, sinus pressure and sinus pain. Negative for ear discharge.    Eyes:  Positive for discharge, redness and itching.   Respiratory: Negative.     All other systems reviewed and are negative.        Current Medications       Current Outpatient Medications:     amoxicillin (AMOXIL) 875 mg tablet, Take 1 tablet (875 mg total) by mouth 2 (two) times a day for 10 days, Disp: 20 tablet, Rfl: 0    tobramycin (TOBREX) 0.3 % SOLN, Administer 1 drop into the left eye every 4 (four) hours while awake, Disp: 5 mL, Rfl: 1    Current Allergies     Allergies as of 2024    (No Known Allergies)              The following portions of the patient's history were reviewed and updated as appropriate: allergies, current medications, past family history, past medical history, past social history, past surgical history and problem list.     Past Medical History:   Diagnosis Date    Anxiety     Depression     GERD (gastroesophageal reflux disease)     Hypertension        Past Surgical History:   Procedure Laterality Date     SECTION      CHOLECYSTECTOMY      US GUIDED BREAST BIOPSY RIGHT COMPLETE Right 2022       Family History   Problem Relation Age of Onset    Hypertension Mother      Transient ischemic attack Mother     Throat cancer Maternal Grandmother     No Known Problems Father     No Known Problems Sister     No Known Problems Daughter     Breast cancer Maternal Aunt     No Known Problems Paternal Aunt     No Known Problems Paternal Aunt     Cancer Paternal Aunt     No Known Problems Paternal Aunt     Breast cancer Paternal Aunt          Medications have been verified.        Objective     /75   Pulse 82   Temp 98.7 °F (37.1 °C)   Resp 18   SpO2 98%   No LMP recorded. Patient is postmenopausal.         Physical Exam     Physical Exam  Vitals and nursing note reviewed.   Constitutional:       General: She is not in acute distress.     Appearance: Normal appearance. She is well-developed. She is not toxic-appearing or diaphoretic.   HENT:      Head: Normocephalic and atraumatic.      Right Ear: Ear canal and external ear normal. No tenderness. Tympanic membrane is not injected or erythematous.      Left Ear: Ear canal and external ear normal. Tenderness present. Tympanic membrane is injected and erythematous.   Eyes:      Pupils: Pupils are equal, round, and reactive to light.   Pulmonary:      Effort: Pulmonary effort is normal. No respiratory distress.   Abdominal:      General: There is no distension.      Palpations: Abdomen is soft.   Musculoskeletal:         General: Normal range of motion.      Cervical back: Normal range of motion and neck supple.   Skin:     General: Skin is warm and dry.      Capillary Refill: Capillary refill takes less than 2 seconds.   Neurological:      General: No focal deficit present.      Mental Status: She is alert and oriented to person, place, and time.   Psychiatric:         Mood and Affect: Mood and affect normal.         Behavior: Behavior normal. Behavior is cooperative.         Thought Content: Thought content normal.         Judgment: Judgment normal.

## 2024-01-14 NOTE — PATIENT INSTRUCTIONS
Change your pillow case tonight and tomorrow night.  Use a clean cloth each time you wipe your eye.  Practice careful handwashing, especially for the first 24 hours of treatment.  If the other eye becomes symptomatic, you may use the same drops the same way for that eye as well.  For the first 24 hours, it is alright to use the drops every 2 hours while awake; after that resume the every 4 hours while awake frequency.  Conjunctivitis   AMBULATORY CARE:   Conjunctivitis , or pink eye, is inflammation of your conjunctiva. The conjunctiva is a thin tissue that covers the front of your eye and the back of your eyelid. The conjunctiva helps protect your eye and keep it moist. The most common cause of conjunctivitis is infection with bacteria or a virus. Allergies or exposure to a chemical may also cause conjunctivitis. Conjunctivitis is easily spread from person to person.       Common signs or symptoms:  You may have symptoms in one or both eyes. You may also have other general symptoms, including a sore throat, runny nose, or fever. You may have any of the following:  Redness in the whites of your eye    Itching in or around your eye    Feeling like there is something in your eye    Watery or thick, sticky discharge    Crusty eyelids when you wake up in the morning    Burning, stinging, or swelling in your eye    Pain when you see bright light    Seek care immediately if:   You have worsening eye pain.    The swelling in your eye gets worse, even after treatment.    Your vision suddenly becomes worse, or you cannot see at all.    Call your doctor if:   Your start to notice changes in your vision.    You develop a fever and ear pain.    You have tiny bumps or spots of blood on your eye.    You have questions or concerns about your condition or care.    Treatment:  Your conjunctivitis may go away on its own. Treatment depends on what is causing your conjunctivitis. You may need any of the following:  Allergy medicine   helps decrease itchy, red, swollen eyes caused by allergies. It may be given as a pill, eye drops, or nasal spray.    Antibiotics  may be needed if your conjunctivitis is caused by bacteria. This medicine may be given as a pill, eye drops, or eye ointment.    Manage your symptoms:   Apply a cool compress.  Wet a washcloth with cold water and place it on your eye. This will help decrease itching and irritation.    Use artificial tears.  This will help lessen your symptoms, including itching or irritation.    Do not wear contact lenses  until treatment is complete and your symptoms are gone.    Flush your eye.  You may need to flush your eye with saline to help decrease your symptoms. Ask for more information on how to flush your eye.    Prevent the spread of conjunctivitis:   Wash your hands with soap and water often.  Wash your hands before and after you touch your eyes. Wash your hands after you use the bathroom, change a child's diaper, or sneeze. Wash your hands before you prepare or eat food.         Avoid contact with others.  Do not share towels or washcloths. Try to stay away from others as much as possible. Ask when you can return to work or school.    Avoid allergens and irritants.  Try to avoid the things that cause your allergies, such as pets, dust, or grass. Stay away from smoke filled areas. Shield your eyes from wind and sun.    Throw away eye makeup.  Bacteria can stay in eye makeup. Throw away your current mascara and other eye makeup. Never share mascara or other eye makeup with anyone.    Follow up with your doctor as directed:  You may be referred to an ophthalmologist for treatment. Write down your questions so you remember to ask them during your visits.  © Copyright [employer] 2023 Information is for End User's use only and may not be sold, redistributed or otherwise used for commercial purposes.  The above information is an  only. It is not intended as medical advice for  individual conditions or treatments. Talk to your doctor, nurse or pharmacist before following any medical regimen to see if it is safe and effective for you.    Ear Infection   AMBULATORY CARE:   An ear infection  is also called otitis media. Blocked or swollen eustachian tubes can cause an infection. Eustachian tubes connect the middle ear to the back of the nose and throat. They drain fluid from the middle ear. You may have a buildup of fluid in your ear. Germs build up in the fluid and infection develops.       Common signs and symptoms:   Ear pain    Fever or a headache    Trouble hearing    Ringing or buzzing in your ear    Plugged ear or an ear that feels full    Dizziness    Nausea or vomiting    Seek care immediately if:   You have clear fluid coming from your ear.    You have a stiff neck, headache, and a fever.    Call your doctor if:   You see blood or pus draining from your ear.    Your ear pain gets worse or does not go away, even after treatment.    The outside of your ear is red or swollen.    You are vomiting or have diarrhea.    You have questions or concerns about your condition or care.    Medicines:  You may  need any of the following:  Acetaminophen  decreases pain and fever. It is available without a doctor's order. Ask how much to take and how often to take it. Follow directions. Read the labels of all other medicines you are using to see if they also contain acetaminophen, or ask your doctor or pharmacist. Acetaminophen can cause liver damage if not taken correctly.    NSAIDs , such as ibuprofen, help decrease swelling, pain, and fever. This medicine is available with or without a doctor's order. NSAIDs can cause stomach bleeding or kidney problems in certain people. If you take blood thinner medicine, always ask your healthcare provider if NSAIDs are safe for you. Always read the medicine label and follow directions.    Ear drops  may contain medicine to decrease pain and  inflammation.    Antibiotics  help treat a bacterial infection.    Self-care:   Apply heat  on your ear for 15 to 20 minutes, 3 to 4 times a day or as directed. You can apply heat with an electric heating pad, hot water bottle, or warm compress. Always put a cloth between your skin and the heat pack to prevent burns. Heat helps decrease pain.    Apply ice  on your ear for 15 to 20 minutes, 3 to 4 times a day for 2 days or as directed. Use an ice pack, or put crushed ice in a plastic bag. Cover it with a towel before you apply it to your ear. Ice decreases swelling and pain.    Prevent an ear infection:   Wash your hands often  to help prevent the spread of germs. Ask everyone in your house to wash their hands with soap and water. Ask them to wash after they use the bathroom or change a diaper. Remind them to wash before they prepare or eat food.         Stay away from people who are ill.  Some germs spread easily and quickly through contact.    Follow up with your doctor as directed:  Write down your questions so you remember to ask them during your visits.  © Copyright [employer] 2023 Information is for End User's use only and may not be sold, redistributed or otherwise used for commercial purposes.  The above information is an  only. It is not intended as medical advice for individual conditions or treatments. Talk to your doctor, nurse or pharmacist before following any medical regimen to see if it is safe and effective for you.

## 2024-02-15 ENCOUNTER — APPOINTMENT (OUTPATIENT)
Dept: LAB | Facility: CLINIC | Age: 64
End: 2024-02-15
Payer: COMMERCIAL

## 2024-02-15 DIAGNOSIS — R30.0 DYSURIA: ICD-10-CM

## 2024-02-15 LAB
BILIRUB UR QL STRIP: NEGATIVE
CLARITY UR: CLEAR
COLOR UR: COLORLESS
GLUCOSE UR STRIP-MCNC: NEGATIVE MG/DL
HGB UR QL STRIP.AUTO: NEGATIVE
KETONES UR STRIP-MCNC: NEGATIVE MG/DL
LEUKOCYTE ESTERASE UR QL STRIP: NEGATIVE
NITRITE UR QL STRIP: NEGATIVE
PH UR STRIP.AUTO: 6 [PH]
PROT UR STRIP-MCNC: NEGATIVE MG/DL
SP GR UR STRIP.AUTO: 1.01 (ref 1–1.03)
UROBILINOGEN UR STRIP-ACNC: <2 MG/DL

## 2024-02-15 PROCEDURE — 87086 URINE CULTURE/COLONY COUNT: CPT

## 2024-02-15 PROCEDURE — 81003 URINALYSIS AUTO W/O SCOPE: CPT

## 2024-02-16 LAB — BACTERIA UR CULT: NORMAL

## 2024-02-28 ENCOUNTER — APPOINTMENT (OUTPATIENT)
Dept: LAB | Facility: CLINIC | Age: 64
End: 2024-02-28
Payer: COMMERCIAL

## 2024-02-28 DIAGNOSIS — R73.01 IMPAIRED FASTING GLUCOSE: ICD-10-CM

## 2024-02-28 DIAGNOSIS — I10 HYPERTENSION, UNSPECIFIED TYPE: ICD-10-CM

## 2024-02-28 DIAGNOSIS — E78.5 HYPERLIPIDEMIA, UNSPECIFIED HYPERLIPIDEMIA TYPE: ICD-10-CM

## 2024-02-28 LAB
ALBUMIN SERPL BCP-MCNC: 4.5 G/DL (ref 3.5–5)
ALP SERPL-CCNC: 79 U/L (ref 34–104)
ALT SERPL W P-5'-P-CCNC: 22 U/L (ref 7–52)
ANION GAP SERPL CALCULATED.3IONS-SCNC: 7 MMOL/L
AST SERPL W P-5'-P-CCNC: 21 U/L (ref 13–39)
BASOPHILS # BLD AUTO: 0.07 THOUSANDS/ÂΜL (ref 0–0.1)
BASOPHILS NFR BLD AUTO: 1 % (ref 0–1)
BILIRUB SERPL-MCNC: 0.43 MG/DL (ref 0.2–1)
BUN SERPL-MCNC: 13 MG/DL (ref 5–25)
CALCIUM SERPL-MCNC: 9.4 MG/DL (ref 8.4–10.2)
CHLORIDE SERPL-SCNC: 101 MMOL/L (ref 96–108)
CHOLEST SERPL-MCNC: 217 MG/DL
CO2 SERPL-SCNC: 29 MMOL/L (ref 21–32)
CREAT SERPL-MCNC: 0.76 MG/DL (ref 0.6–1.3)
EOSINOPHIL # BLD AUTO: 0.47 THOUSAND/ÂΜL (ref 0–0.61)
EOSINOPHIL NFR BLD AUTO: 4 % (ref 0–6)
ERYTHROCYTE [DISTWIDTH] IN BLOOD BY AUTOMATED COUNT: 13.7 % (ref 11.6–15.1)
EST. AVERAGE GLUCOSE BLD GHB EST-MCNC: 128 MG/DL
GFR SERPL CREATININE-BSD FRML MDRD: 84 ML/MIN/1.73SQ M
GLUCOSE P FAST SERPL-MCNC: 106 MG/DL (ref 65–99)
HBA1C MFR BLD: 6.1 %
HCT VFR BLD AUTO: 45.6 % (ref 34.8–46.1)
HDLC SERPL-MCNC: 53 MG/DL
HGB BLD-MCNC: 14.5 G/DL (ref 11.5–15.4)
IMM GRANULOCYTES # BLD AUTO: 0.05 THOUSAND/UL (ref 0–0.2)
IMM GRANULOCYTES NFR BLD AUTO: 1 % (ref 0–2)
LDLC SERPL CALC-MCNC: 105 MG/DL (ref 0–100)
LYMPHOCYTES # BLD AUTO: 1.86 THOUSANDS/ÂΜL (ref 0.6–4.47)
LYMPHOCYTES NFR BLD AUTO: 18 % (ref 14–44)
MCH RBC QN AUTO: 28.7 PG (ref 26.8–34.3)
MCHC RBC AUTO-ENTMCNC: 31.8 G/DL (ref 31.4–37.4)
MCV RBC AUTO: 90 FL (ref 82–98)
MONOCYTES # BLD AUTO: 0.55 THOUSAND/ÂΜL (ref 0.17–1.22)
MONOCYTES NFR BLD AUTO: 5 % (ref 4–12)
NEUTROPHILS # BLD AUTO: 7.65 THOUSANDS/ÂΜL (ref 1.85–7.62)
NEUTS SEG NFR BLD AUTO: 71 % (ref 43–75)
NONHDLC SERPL-MCNC: 164 MG/DL
NRBC BLD AUTO-RTO: 0 /100 WBCS
PLATELET # BLD AUTO: 358 THOUSANDS/UL (ref 149–390)
PMV BLD AUTO: 10.5 FL (ref 8.9–12.7)
POTASSIUM SERPL-SCNC: 4.3 MMOL/L (ref 3.5–5.3)
PROT SERPL-MCNC: 8.1 G/DL (ref 6.4–8.4)
RBC # BLD AUTO: 5.05 MILLION/UL (ref 3.81–5.12)
SODIUM SERPL-SCNC: 137 MMOL/L (ref 135–147)
TRIGL SERPL-MCNC: 296 MG/DL
WBC # BLD AUTO: 10.65 THOUSAND/UL (ref 4.31–10.16)

## 2024-02-28 PROCEDURE — 80053 COMPREHEN METABOLIC PANEL: CPT

## 2024-02-28 PROCEDURE — 36415 COLL VENOUS BLD VENIPUNCTURE: CPT

## 2024-02-28 PROCEDURE — 85025 COMPLETE CBC W/AUTO DIFF WBC: CPT

## 2024-02-28 PROCEDURE — 80061 LIPID PANEL: CPT

## 2024-02-28 PROCEDURE — 83036 HEMOGLOBIN GLYCOSYLATED A1C: CPT

## 2024-03-05 ENCOUNTER — APPOINTMENT (OUTPATIENT)
Dept: LAB | Facility: CLINIC | Age: 64
End: 2024-03-05
Payer: COMMERCIAL

## 2024-03-05 ENCOUNTER — APPOINTMENT (OUTPATIENT)
Dept: RADIOLOGY | Facility: CLINIC | Age: 64
End: 2024-03-05
Payer: COMMERCIAL

## 2024-03-05 DIAGNOSIS — M54.50 LOW BACK PAIN, UNSPECIFIED BACK PAIN LATERALITY, UNSPECIFIED CHRONICITY, UNSPECIFIED WHETHER SCIATICA PRESENT: ICD-10-CM

## 2024-03-05 PROCEDURE — 72110 X-RAY EXAM L-2 SPINE 4/>VWS: CPT

## 2024-03-16 ENCOUNTER — HOSPITAL ENCOUNTER (EMERGENCY)
Facility: HOSPITAL | Age: 64
Discharge: HOME | End: 2024-03-16
Attending: INTERNAL MEDICINE
Payer: COMMERCIAL

## 2024-03-16 VITALS
DIASTOLIC BLOOD PRESSURE: 86 MMHG | HEART RATE: 87 BPM | HEIGHT: 63 IN | BODY MASS INDEX: 31.89 KG/M2 | TEMPERATURE: 98.3 F | OXYGEN SATURATION: 96 % | SYSTOLIC BLOOD PRESSURE: 208 MMHG | WEIGHT: 180 LBS | RESPIRATION RATE: 20 BRPM

## 2024-03-16 DIAGNOSIS — N39.0 UTI (URINARY TRACT INFECTION): Primary | ICD-10-CM

## 2024-03-16 LAB
BACTERIA UR QL AUTO: ABNORMAL /HPF
BILIRUB UR QL STRIP: NEGATIVE
CLARITY UR: ABNORMAL
COLOR UR: YELLOW
GLUCOSE UR STRIP-MCNC: NEGATIVE MG/DL
HGB UR QL STRIP.AUTO: ABNORMAL
KETONES UR STRIP-MCNC: NEGATIVE MG/DL
LEUKOCYTE ESTERASE UR QL STRIP: ABNORMAL
NITRITE UR QL STRIP: POSITIVE
NON-SQ EPI CELLS URNS QL MICRO: ABNORMAL /HPF
PH UR STRIP.AUTO: 6.5 [PH]
PROT UR STRIP-MCNC: NEGATIVE MG/DL
RBC #/AREA URNS AUTO: ABNORMAL /HPF
SP GR UR STRIP.AUTO: 1
UROBILINOGEN UR QL STRIP.AUTO: 0.2 E.U./DL
WBC #/AREA URNS AUTO: ABNORMAL /HPF

## 2024-03-16 PROCEDURE — 81001 URINALYSIS AUTO W/SCOPE: CPT | Performed by: INTERNAL MEDICINE

## 2024-03-16 PROCEDURE — 99283 EMERGENCY DEPT VISIT LOW MDM: CPT

## 2024-03-16 PROCEDURE — 87077 CULTURE AEROBIC IDENTIFY: CPT | Performed by: INTERNAL MEDICINE

## 2024-03-16 PROCEDURE — 87186 SC STD MICRODIL/AGAR DIL: CPT | Performed by: INTERNAL MEDICINE

## 2024-03-16 PROCEDURE — 87086 URINE CULTURE/COLONY COUNT: CPT | Performed by: INTERNAL MEDICINE

## 2024-03-16 PROCEDURE — 99284 EMERGENCY DEPT VISIT MOD MDM: CPT | Performed by: INTERNAL MEDICINE

## 2024-03-16 RX ORDER — CEPHALEXIN 500 MG/1
500 CAPSULE ORAL ONCE
Status: COMPLETED | OUTPATIENT
Start: 2024-03-16 | End: 2024-03-16

## 2024-03-16 RX ORDER — CEPHALEXIN 500 MG/1
500 CAPSULE ORAL EVERY 6 HOURS SCHEDULED
Qty: 28 CAPSULE | Refills: 0 | Status: SHIPPED | OUTPATIENT
Start: 2024-03-16 | End: 2024-03-23

## 2024-03-16 RX ADMIN — CEPHALEXIN 500 MG: 500 CAPSULE ORAL at 17:26

## 2024-03-16 NOTE — DISCHARGE INSTRUCTIONS
Drink plenty of water, try to keep system flushed, recommend cranberry juice and/or cranberry tablets to acidify the urine-

## 2024-03-16 NOTE — ED PROVIDER NOTES
History  Chief Complaint   Patient presents with   • Back Pain     Pain across lower back. Moreso with movement     63-year-old female complaining of low back pain mostly right-sided notices cloudy dark urine.  States her symptoms been going on for several weeks worsening over the past month.  Patient was seen by her PCP 4 weeks ago had x-rays of her LS spine, lab work as well as a urine which were all negative.  At that time however her urine was clear and at this time it is cloudy, she describes frequency urgency and decreased output.  She denies fever chills rigors.  The pain does worsen with certain movements.  She has had some nausea no vomiting.  She denies any abdominal pain.      Prior to Admission Medications   Prescriptions Last Dose Informant Patient Reported? Taking?   tobramycin (TOBREX) 0.3 % SOLN Not Taking  No No   Sig: Administer 1 drop into the left eye every 4 (four) hours while awake   Patient not taking: Reported on 3/16/2024      Facility-Administered Medications: None       Past Medical History:   Diagnosis Date   • Anxiety    • Depression    • GERD (gastroesophageal reflux disease)    • Hypertension        Past Surgical History:   Procedure Laterality Date   •  SECTION     • CHOLECYSTECTOMY     • US GUIDED BREAST BIOPSY RIGHT COMPLETE Right 2022       Family History   Problem Relation Age of Onset   • Hypertension Mother    • Transient ischemic attack Mother    • Throat cancer Maternal Grandmother    • No Known Problems Father    • No Known Problems Sister    • No Known Problems Daughter    • Breast cancer Maternal Aunt    • No Known Problems Paternal Aunt    • No Known Problems Paternal Aunt    • Cancer Paternal Aunt    • No Known Problems Paternal Aunt    • Breast cancer Paternal Aunt      I have reviewed and agree with the history as documented.    E-Cigarette/Vaping   • E-Cigarette Use Current Some Day User      E-Cigarette/Vaping Substances     Social History     Tobacco  Use   • Smoking status: Former     Current packs/day: 0.00     Types: Cigarettes     Quit date: 2018     Years since quittin.3   • Smokeless tobacco: Never   Vaping Use   • Vaping status: Some Days   Substance Use Topics   • Alcohol use: Not Currently   • Drug use: Never       Review of Systems   Constitutional:  Negative for fatigue and fever.   HENT: Negative.     Respiratory: Negative.     Cardiovascular: Negative.    Gastrointestinal: Negative.    Genitourinary:  Positive for dysuria, frequency and urgency.   Musculoskeletal: Negative.    Skin: Negative.    Neurological: Negative.    Hematological: Negative.    Psychiatric/Behavioral: Negative.         Physical Exam  Physical Exam  Constitutional:       General: She is not in acute distress.     Appearance: Normal appearance. She is normal weight. She is not ill-appearing.   HENT:      Head: Normocephalic and atraumatic.   Eyes:      Extraocular Movements: Extraocular movements intact.      Conjunctiva/sclera: Conjunctivae normal.   Cardiovascular:      Rate and Rhythm: Normal rate and regular rhythm.   Pulmonary:      Effort: Pulmonary effort is normal.      Breath sounds: Normal breath sounds.   Abdominal:      General: Abdomen is flat. Bowel sounds are normal.      Palpations: Abdomen is soft.   Musculoskeletal:         General: Tenderness present. Normal range of motion.      Cervical back: Normal range of motion and neck supple.   Skin:     General: Skin is warm and dry.      Findings: No rash.   Neurological:      General: No focal deficit present.      Mental Status: She is alert and oriented to person, place, and time.   Psychiatric:         Mood and Affect: Mood normal.         Behavior: Behavior normal.         Thought Content: Thought content normal.         Judgment: Judgment normal.       Vital Signs  ED Triage Vitals [24 1516]   Temperature Pulse Respirations Blood Pressure SpO2   98.3 °F (36.8 °C) 87 20 (!) 208/86 96 %      Temp  Source Heart Rate Source Patient Position - Orthostatic VS BP Location FiO2 (%)   Temporal Monitor Sitting Left arm --      Pain Score       6           Vitals:    03/16/24 1516   BP: (!) 208/86   Pulse: 87   Patient Position - Orthostatic VS: Sitting         Visual Acuity      ED Medications  Medications - No data to display    Diagnostic Studies  Results Reviewed       None                   No orders to display              Procedures  Procedures         ED Course                                             Medical Decision Making  Patient with complaint of urinary frequency urgency low back pain and pressure while urinating with decreased volume.  Positive for UTI nitrate positive for normal bacteria states she has had Keflex in the past and it has worked for urinary tract infections.    Amount and/or Complexity of Data Reviewed  Labs: ordered.    Risk  Prescription drug management.           Disposition  Final diagnoses:   None     ED Disposition       None          Follow-up Information    None         Patient's Medications   Discharge Prescriptions    No medications on file       No discharge procedures on file.    PDMP Review       None            ED Provider  Electronically Signed by             Tito Zarco MD  03/17/24 0016

## 2024-03-19 LAB
BACTERIA UR CULT: ABNORMAL
BACTERIA UR CULT: ABNORMAL

## 2024-09-04 ENCOUNTER — HOSPITAL ENCOUNTER (OUTPATIENT)
Dept: MAMMOGRAPHY | Facility: HOSPITAL | Age: 64
Discharge: HOME | End: 2024-09-04
Attending: SPECIALIST
Payer: COMMERCIAL

## 2024-09-04 VITALS — HEIGHT: 63 IN | BODY MASS INDEX: 31.89 KG/M2 | WEIGHT: 180 LBS

## 2024-09-04 DIAGNOSIS — Z12.31 ENCOUNTER FOR SCREENING MAMMOGRAM FOR MALIGNANT NEOPLASM OF BREAST: ICD-10-CM

## 2024-09-04 PROCEDURE — 77067 SCR MAMMO BI INCL CAD: CPT

## 2024-09-04 PROCEDURE — 77063 BREAST TOMOSYNTHESIS BI: CPT

## 2024-09-17 ENCOUNTER — HOSPITAL ENCOUNTER (OUTPATIENT)
Dept: ULTRASOUND IMAGING | Facility: HOSPITAL | Age: 64
Discharge: HOME | End: 2024-09-17
Attending: SPECIALIST
Payer: COMMERCIAL

## 2024-09-17 ENCOUNTER — HOSPITAL ENCOUNTER (OUTPATIENT)
Dept: MAMMOGRAPHY | Facility: HOSPITAL | Age: 64
Discharge: HOME | End: 2024-09-17
Payer: COMMERCIAL

## 2024-09-17 VITALS — SYSTOLIC BLOOD PRESSURE: 146 MMHG | DIASTOLIC BLOOD PRESSURE: 56 MMHG | OXYGEN SATURATION: 99 % | HEART RATE: 76 BPM

## 2024-09-17 DIAGNOSIS — R92.8 ABNORMAL MAMMOGRAM: ICD-10-CM

## 2024-09-17 PROCEDURE — 88305 TISSUE EXAM BY PATHOLOGIST: CPT | Performed by: PATHOLOGY

## 2024-09-17 PROCEDURE — 88360 TUMOR IMMUNOHISTOCHEM/MANUAL: CPT | Performed by: PATHOLOGY

## 2024-09-17 PROCEDURE — 76642 ULTRASOUND BREAST LIMITED: CPT

## 2024-09-17 PROCEDURE — 88341 IMHCHEM/IMCYTCHM EA ADD ANTB: CPT | Performed by: PATHOLOGY

## 2024-09-17 PROCEDURE — 88342 IMHCHEM/IMCYTCHM 1ST ANTB: CPT | Performed by: PATHOLOGY

## 2024-09-17 PROCEDURE — 19083 BX BREAST 1ST LESION US IMAG: CPT

## 2024-09-17 PROCEDURE — A4648 IMPLANTABLE TISSUE MARKER: HCPCS

## 2024-09-17 RX ORDER — LIDOCAINE HYDROCHLORIDE 10 MG/ML
5 INJECTION, SOLUTION EPIDURAL; INFILTRATION; INTRACAUDAL; PERINEURAL ONCE
Status: COMPLETED | OUTPATIENT
Start: 2024-09-17 | End: 2024-09-17

## 2024-09-17 RX ADMIN — LIDOCAINE HYDROCHLORIDE 5 ML: 10 INJECTION, SOLUTION EPIDURAL; INFILTRATION; INTRACAUDAL; PERINEURAL at 09:02

## 2024-09-17 NOTE — PROGRESS NOTES
Met with patient and   regarding recommendation for;    __x___ RIGHT ______LEFT      __x___Ultrasound guided  ______Stereotactic breast biopsy.      __X___Verbalized understanding.    Reviewed clip placement with patient, pt states understanding: Yes: ______ No: ______  Comments:    Blood thinners:  No: __x___ Yes: ______ What:          Biopsy teaching sheet given:  Yes: ___X___ No: ________    Pt given contact information and adv to call with any questions/needs    Pt added on today 9/17/24.

## 2024-09-17 NOTE — PROGRESS NOTES
Procedure type:    __x___ultrasound guided _____stereotactic    Breast:    _____Left __x___Right    Location: 2 oclock, 3 cmfn    Needle: 12G    # of passes:3    Clip: hydramark butterfly    Performed by: Dr. BENJAMÍN Herrera    Pressure held for 5 minutes by: KRYSTAL Altman    Steri Strips:    ___X__yes _____no    Band aid:    __X___yes_____no    Tolerated procedure:    __X___yes _____no

## 2024-09-19 PROCEDURE — 88305 TISSUE EXAM BY PATHOLOGIST: CPT | Performed by: PATHOLOGY

## 2024-09-19 PROCEDURE — 88342 IMHCHEM/IMCYTCHM 1ST ANTB: CPT | Performed by: PATHOLOGY

## 2024-09-19 PROCEDURE — 88341 IMHCHEM/IMCYTCHM EA ADD ANTB: CPT | Performed by: PATHOLOGY

## 2024-09-19 PROCEDURE — 88360 TUMOR IMMUNOHISTOCHEM/MANUAL: CPT | Performed by: PATHOLOGY

## 2024-09-19 NOTE — PROGRESS NOTES
Post procedure call completed    Bleeding: _____yes __X___no (pt denies)    Pain: _____yes ___X___no (pt with soreness, used ice, took Tylenol x1)    Redness/Swelling: ______yes ___X___no (pt with small bruise)    Band aid removed: ___X__yes _____no     Steri-Strips intact: ___X___yes _____no (discussed with patient to remove steri strips on Sunday if they have not come off on their own)    Pt with no questions at this time, adv will call when results available, adv to call with any questions or concerns, has name/# for contact

## 2024-09-20 ENCOUNTER — DOCUMENTATION (OUTPATIENT)
Dept: HEMATOLOGY ONCOLOGY | Facility: CLINIC | Age: 64
End: 2024-09-20

## 2024-09-20 ENCOUNTER — TELEPHONE (OUTPATIENT)
Dept: MAMMOGRAPHY | Facility: CLINIC | Age: 64
End: 2024-09-20

## 2024-09-20 DIAGNOSIS — C50.411 MALIGNANT NEOPLASM OF UPPER-OUTER QUADRANT OF RIGHT FEMALE BREAST, UNSPECIFIED ESTROGEN RECEPTOR STATUS (HCC): Primary | ICD-10-CM

## 2024-09-20 NOTE — TELEPHONE ENCOUNTER
Call placed to patient after pt given biopsy results from radiologist, Dr. Juaquin Herrera, questions answered, support given, adv next step is to set patient up with surgeon, options discussed and pt would like to have appt made with Dr. Laly Qiu, adv patient that  would call her back by Monday with appt, pt states understanding, pt with no questions at this time, has name/# for any further needs    Pt also adv that she would be getting call from Nurse Navigator Madina for additional support and continued care, discussed genetic testing and adv that Madina would be providing more information and making referral, pt interested in completing

## 2024-09-20 NOTE — PROGRESS NOTES
All records needed are in patients chart. No records retrieval needed at this time.     Referral received/ Chart reviewed for work up completed     Imaging completed:    [] PET/CT   [] MRI   [] CT   [x] US   [x] Mammo   [] Bone scan   [] N/A    Pathology completed:    Date: 09/17/2024   Location: [facility]   []N/A    Additional records needed:   [] Genomic report   [] Genetic testing results   [] Office Note   [] Procedure/ Operative note   [] Lab results   [] N/A      [] Radiation Oncology records retrieval needed (PN to route to rad/onc clerical pool once scheduled)  Date:  Location:

## 2024-09-20 NOTE — PROGRESS NOTES
Breast Cancer Nurse Navigator    Chart reviewed for prepping for initial outreach by nurse navigator.    Nurse Navigator and Patient Navigator added to care team    Diagnosis: R IMC w/mucinous features ER/AK+ Her2-    Recent Imagin2024 Mammo screening bialteral w 3d & cad  2024 US breast right limited (diagnostic)  2024 US breast right breast biopsy complete/ Mammo post biopsy right.    Radiology and pathology are concordant    Sonographic evaluation of the right axilla has not been performed.     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 no    Genetic testing: TBD    Genomic testing: TBD    Family history of cancer:  History of breast cancer in Maternal Aunt, Paternal Aunt. History of throat cancer in Maternal grandmother    Previous Breast Cancer Diagnosis:  No    Any further needs:     Other:    Information forwarded to Outbound PEP: 2024    Please schedule patient with Dr Laly Qiu by 7 days at [facility] or [facility] (preferred location)    Nurse Navigator will call patient for initial outreach.      Will have Patient Navigator outreach patient after surgical oncology consultation.  Any clinical questions to be directed to office nurse.

## 2024-09-21 ENCOUNTER — APPOINTMENT (OUTPATIENT)
Dept: LAB | Facility: CLINIC | Age: 64
End: 2024-09-21
Payer: COMMERCIAL

## 2024-09-21 DIAGNOSIS — R03.0 ELEVATED BLOOD PRESSURE READING WITHOUT DIAGNOSIS OF HYPERTENSION: Primary | ICD-10-CM

## 2024-09-21 DIAGNOSIS — Z11.59 SCREENING EXAMINATION FOR POLIOMYELITIS: ICD-10-CM

## 2024-09-21 DIAGNOSIS — R73.01 IMPAIRED FASTING GLUCOSE: ICD-10-CM

## 2024-09-21 DIAGNOSIS — E78.2 MIXED HYPERLIPIDEMIA: ICD-10-CM

## 2024-09-21 LAB
ALBUMIN SERPL BCG-MCNC: 4.4 G/DL (ref 3.5–5)
ALP SERPL-CCNC: 63 U/L (ref 34–104)
ALT SERPL W P-5'-P-CCNC: 24 U/L (ref 7–52)
ANION GAP SERPL CALCULATED.3IONS-SCNC: 10 MMOL/L (ref 4–13)
AST SERPL W P-5'-P-CCNC: 21 U/L (ref 13–39)
BILIRUB SERPL-MCNC: 0.51 MG/DL (ref 0.2–1)
BUN SERPL-MCNC: 15 MG/DL (ref 5–25)
CALCIUM SERPL-MCNC: 9.2 MG/DL (ref 8.4–10.2)
CHLORIDE SERPL-SCNC: 105 MMOL/L (ref 96–108)
CO2 SERPL-SCNC: 25 MMOL/L (ref 21–32)
CREAT SERPL-MCNC: 0.71 MG/DL (ref 0.6–1.3)
ERYTHROCYTE [DISTWIDTH] IN BLOOD BY AUTOMATED COUNT: 13.3 % (ref 11.6–15.1)
EST. AVERAGE GLUCOSE BLD GHB EST-MCNC: 126 MG/DL
GFR SERPL CREATININE-BSD FRML MDRD: 91 ML/MIN/1.73SQ M
GLUCOSE P FAST SERPL-MCNC: 102 MG/DL (ref 65–99)
HBA1C MFR BLD: 6 %
HCT VFR BLD AUTO: 44.6 % (ref 34.8–46.1)
HCV AB SER QL: NORMAL
HGB BLD-MCNC: 14 G/DL (ref 11.5–15.4)
LDLC SERPL DIRECT ASSAY-MCNC: 106 MG/DL (ref 0–100)
MCH RBC QN AUTO: 29.3 PG (ref 26.8–34.3)
MCHC RBC AUTO-ENTMCNC: 31.4 G/DL (ref 31.4–37.4)
MCV RBC AUTO: 93 FL (ref 82–98)
PLATELET # BLD AUTO: 329 THOUSANDS/UL (ref 149–390)
PMV BLD AUTO: 11.2 FL (ref 8.9–12.7)
POTASSIUM SERPL-SCNC: 4.1 MMOL/L (ref 3.5–5.3)
PROT SERPL-MCNC: 7.9 G/DL (ref 6.4–8.4)
RBC # BLD AUTO: 4.78 MILLION/UL (ref 3.81–5.12)
SODIUM SERPL-SCNC: 140 MMOL/L (ref 135–147)
TRIGL SERPL-MCNC: 209 MG/DL
WBC # BLD AUTO: 7.69 THOUSAND/UL (ref 4.31–10.16)

## 2024-09-21 PROCEDURE — 84478 ASSAY OF TRIGLYCERIDES: CPT

## 2024-09-21 PROCEDURE — 85027 COMPLETE CBC AUTOMATED: CPT

## 2024-09-21 PROCEDURE — 83721 ASSAY OF BLOOD LIPOPROTEIN: CPT

## 2024-09-21 PROCEDURE — 86803 HEPATITIS C AB TEST: CPT

## 2024-09-21 PROCEDURE — 83036 HEMOGLOBIN GLYCOSYLATED A1C: CPT

## 2024-09-21 PROCEDURE — 80053 COMPREHEN METABOLIC PANEL: CPT

## 2024-09-21 PROCEDURE — 36415 COLL VENOUS BLD VENIPUNCTURE: CPT

## 2024-09-23 ENCOUNTER — PATIENT OUTREACH (OUTPATIENT)
Dept: HEMATOLOGY ONCOLOGY | Facility: CLINIC | Age: 64
End: 2024-09-23

## 2024-09-23 ENCOUNTER — TELEPHONE (OUTPATIENT)
Age: 64
End: 2024-09-23

## 2024-09-23 DIAGNOSIS — C50.911 MALIGNANT NEOPLASM OF RIGHT BREAST IN FEMALE, ESTROGEN RECEPTOR POSITIVE, UNSPECIFIED SITE OF BREAST (HCC): Primary | ICD-10-CM

## 2024-09-23 DIAGNOSIS — Z17.0 MALIGNANT NEOPLASM OF RIGHT BREAST IN FEMALE, ESTROGEN RECEPTOR POSITIVE, UNSPECIFIED SITE OF BREAST (HCC): Primary | ICD-10-CM

## 2024-09-23 NOTE — PROGRESS NOTES
Breast Oncology Nurse Navigator    Called patient for initial outreach from nurse navigator.  Introduced myself and my role as well as members of the navigation team.  Oncology Nurse Navigator will follow patient until they are seen by surgical oncology, after which the patient navigator initiate outreach and follow the patient.      Referral received from [facility]   Breast cancer diagnosis was made after a right breast US guided biopsy.   Patient states an understanding/awareness of diagnosis    Any other issues/diagnoses?  no.      Confirmed upcoming appointment with Dr. Qiu, including date, time and location.  Patient will be accompanied by her daughter  Provided brief explanation of what to expect at this visit, Including a review of her pathology, discussion of surgery options, recommendations for both medical radiation oncologist, and a breast exam. Patient stated in conversation, she has had some discomfort in her breast since the biopsy,she had to take Tylenol once and denies any erythema or warm to touch.   Patient has transportation to appointments.    Patient lives with her . She has two children who are there for her at this time and are very supportive of her.  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  She has no issues with transportation to and from appointments.   Referral placed to oncology social worker: no    Cancer family history includes her maternal and paternal aunts with breast cancer. She said her paternal aunt passed away due to th cancer. She also has a maternal grandmother with throat cancer.Discussed genetic testing with patient.She is interested in pursuing it but is concerned her insurance will not cover it. She previously reached out to them and was informed she would need a prior auth for the testing. Patient also said she left a message for Dr Qiu's nurse Carie. Recommended she speak with the Genetics team to see if they could be of assistance. She  agreed and a STAT referral was placed.  Referral to oncology genetics: yes    Discussed the Cancer Support Community and some of the programs and services offered  Discussed Breast Cancer Support group that meets [facility] at [facility].  Information sent via [employer].    Patient has my contact information and knows she can reach out with questions.  Office hours provided.    General assessment completed.  Patient does have [employer] set up  [employer] message sent with our team's contact information.

## 2024-09-23 NOTE — TELEPHONE ENCOUNTER
Patient calling back for Carie.  She stated that she spoke with her insurance and she will need a PA submitted for any genetic testing. She stated it could take 2 weeks and they recommended the provider jagdish it [employer] to help expedite.  Patient requesting the genetic testing be ordered so the PA can be submitted and she can complete as soon as possible. She stated she is getting nervous and does not want to delay.    CB#[phone]

## 2024-09-24 ENCOUNTER — TELEPHONE (OUTPATIENT)
Dept: GENETICS | Facility: CLINIC | Age: 64
End: 2024-09-24

## 2024-09-24 NOTE — TELEPHONE ENCOUNTER
I introduced myself to Gardenia and let her know that her nurse navigator reached out to the cancer risk and genetics program on her behalf.    I reviewed the following with Gardenia:    While the majority of cancer occurs by chance, approximately 5-10% of breast cancer has an underlying genetic cause. Genetic testing is available which can determine if there is an underlying genetic cause to your cancer. Understanding if there is an underlying genetic cause can:  Provide your surgeon with additional information to help with surgical decisions, treatment decisions and eligibility for clinical trials.    It can determine if you have an increased risk for any additional cancers.  Help family members understand their cancer risk.       We work closely with the [facility] breast surgeons and are reaching out to see if you have interest in genetic testing. The reason we are reaching out at this time is that this result may help your surgeon determine the appropriate type of surgery (i.e. lumpectomy vs mastectomy). This test is not a requirement but can take 5-10 days to complete so we would like to start the process as soon as possible so the results are ready for your appointment with your surgeon.       If you are interested in genetic testing, I can collect your family history and initiate genetic testing for you.        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 No  If yes type/age of diagnosis:     Family history:   Do you have Ashkenazi [employer]ec[employer]ism ancestry? No  If yes, maternal, paternal, or both?    Do you have any children? Yes  How many sons? 1  How many daughters? 1  Do any of your children have a history of cancer? No  If yes type/age of diagnosis:     Do you have any brothers or sisters? Yes  How many brothers? 2  How many sisters?  1  Are they from the same parents? No  If no how maternal/paternal half-siblings: Maternal half brother x 2, Maternal half sister x 1  Do any of your brothers or sisters have a history of cancer? No  If yes who and the type/age of diagnosis:   Maternal Half Brother - Being followed for high PSA level          Do you have nieces or nephews? Yes  Do any of them have a history of cancer? No  If yes type/age of diagnosis:    Does your mother have a history of cancer? No  If yes, cancer type and age of diagnosis:   Is your mother still living? Yes  Age/Age of death: 82    Thinking about your mother's family (aunts, uncles, cousins, grandparents) is there anyone with a history of cancer? Yes  If yes, list relationship, cancer type and age of diagnosis:  MGM - Throat Cancer age 52 (d. 66)  Maternal Aunt - Breast Cancer age 70    Does your father have a history of cancer? No  If yes, cancer type and age of diagnosis:   Is your father still living? Yes  Age/age of death: 86    Thinking about your father's family (aunts, uncles, cousins, grandparents) is there anyone with a history of cancer? Yes  If yes, list relationship, cancer type and age of diagnosis:   Paternal Aunt - Breast Cancer age 77 (d. 79)     Types of Results - Positive, Negative, VUS  Positive Result - May explain personal diagnosis/family history. Can give surgeon information on treatment plan, inform future screening/management or tell a person about other possible risks. Positive results can initiate testing for other family members who may be at risk (children, siblings, etc)  Negative Result - Does not give an explanation. Surgical/treatment plan will be based on clinical presentation and will be part of discussion with surgeon. Negative result cannot be passed down to children, but they are still at elevated risk.  Uncertain Result - Common, but treated like a negative result clinically. 90% are downgraded over time.     [facility]'s billing policy    Most insurance plans cover the cost of genetic testing. The out-of-pocket cost varies due to the differences in deductibles, co-payments and co-insurance defined by individual plans but 90% of people pay $100 or less for a genetic test     A blood kit will be mailed to you overnight. Please take the blood kit along with packet of paperwork to any [facility] lab to have your blood drawn.    We have genetic counselors available, if you have any additional questions or would like to speak with them we can schedule you a 15 minute appointment.      Plan:  A blood kit was mailed out on 9/24/2024 along with information on genetic testing and the lab's billing policy.     Genetic Testing Preformed: [email or web address] BRCA[facility] STAT Panel (13 genes): RADHA, BARD1, BRCA1, BRCA2, CDH1, CHEK2, NF1, PALB2, PTEN, RAD51C, RAD51D, STK11, TP53 with reflex to [email or web address] CustomNext: Cancer+RNAinsight (59 genes): APC, RADHA, AXIN2, BAP1, BARD1, BMPR1A, BRCA1, BRCA2, BRIP1, CDH1, CDK4, CDKN1B, CDKN2A, CHEK2, CTNNA1, DICER1, EGLN1, EPCAM, FH, FLCN, GREM1, HOXB13, KIF1B, KIT, MAX, MEN1, MET, MITF, MLH1, MSH2, MSH3, MSH6, MUTYH, NF1, NTHL1, PALB2, PDGFRA PMS2, POLD1, POLE, POT1, PTEN, RAD51C, RAD51D, RB1, RET, SDHA, SDHAF2, SDHB, SDHC, SDHD, SMAD4, SMARCA4, STK11, [id number], TP53, TSC1, TSC2, VHL     Result Call Information:  I confirmed the patient's mobile number on file as the best number to call with results  I confirmed with the patient that we can leave a voicemail on the provided numbers    Initial results will take approximately 5-12 days to return     Additional results may take up to 2-3 weeks to complete once test is started.    Patient does not have any further questions, declined meeting with genetic counselor    When your results are ready, someone from the genetics team will call you, review the results, and contact your breast surgeon. You will be contacted with any type of result- positive, negative, or uncertain.

## 2024-09-25 ENCOUNTER — APPOINTMENT (OUTPATIENT)
Dept: LAB | Facility: CLINIC | Age: 64
End: 2024-09-25
Payer: COMMERCIAL

## 2024-09-25 DIAGNOSIS — Z80.3 FAMILY HISTORY OF BREAST CANCER: ICD-10-CM

## 2024-09-25 DIAGNOSIS — C50.911 MALIGNANT NEOPLASM OF RIGHT FEMALE BREAST, UNSPECIFIED ESTROGEN RECEPTOR STATUS, UNSPECIFIED SITE OF BREAST (HCC): ICD-10-CM

## 2024-09-25 PROCEDURE — 36415 COLL VENOUS BLD VENIPUNCTURE: CPT

## 2024-10-01 ENCOUNTER — LAB (OUTPATIENT)
Dept: LAB | Facility: CLINIC | Age: 64
End: 2024-10-01
Payer: COMMERCIAL

## 2024-10-01 ENCOUNTER — CONSULT (OUTPATIENT)
Dept: SURGERY | Facility: CLINIC | Age: 64
End: 2024-10-01
Payer: COMMERCIAL

## 2024-10-01 ENCOUNTER — DOCUMENTATION (OUTPATIENT)
Dept: HEMATOLOGY ONCOLOGY | Facility: CLINIC | Age: 64
End: 2024-10-01

## 2024-10-01 VITALS
HEART RATE: 79 BPM | BODY MASS INDEX: 33.66 KG/M2 | RESPIRATION RATE: 16 BRPM | SYSTOLIC BLOOD PRESSURE: 160 MMHG | OXYGEN SATURATION: 99 % | TEMPERATURE: 97.5 F | WEIGHT: 190 LBS | HEIGHT: 63 IN | DIASTOLIC BLOOD PRESSURE: 82 MMHG

## 2024-10-01 DIAGNOSIS — R30.0 DYSURIA: ICD-10-CM

## 2024-10-01 DIAGNOSIS — C50.911 INVASIVE DUCTAL CARCINOMA OF BREAST, FEMALE, RIGHT (HCC): Primary | ICD-10-CM

## 2024-10-01 DIAGNOSIS — C50.911 INVASIVE DUCTAL CARCINOMA OF BREAST, FEMALE, RIGHT (HCC): ICD-10-CM

## 2024-10-01 LAB
BACTERIA UR QL AUTO: ABNORMAL /HPF
BILIRUB UR QL STRIP: NEGATIVE
CLARITY UR: ABNORMAL
COLOR UR: ABNORMAL
GLUCOSE UR STRIP-MCNC: NEGATIVE MG/DL
HGB UR QL STRIP.AUTO: ABNORMAL
KETONES UR STRIP-MCNC: NEGATIVE MG/DL
LEUKOCYTE ESTERASE UR QL STRIP: ABNORMAL
NITRITE UR QL STRIP: POSITIVE
NON-SQ EPI CELLS URNS QL MICRO: ABNORMAL /HPF
PH UR STRIP.AUTO: 6 [PH]
PROT UR STRIP-MCNC: ABNORMAL MG/DL
RBC #/AREA URNS AUTO: ABNORMAL /HPF
SP GR UR STRIP.AUTO: 1.01 (ref 1–1.03)
UROBILINOGEN UR STRIP-ACNC: <2 MG/DL
WBC #/AREA URNS AUTO: ABNORMAL /HPF

## 2024-10-01 PROCEDURE — 87077 CULTURE AEROBIC IDENTIFY: CPT

## 2024-10-01 PROCEDURE — 81001 URINALYSIS AUTO W/SCOPE: CPT

## 2024-10-01 PROCEDURE — 87086 URINE CULTURE/COLONY COUNT: CPT

## 2024-10-01 PROCEDURE — 87186 SC STD MICRODIL/AGAR DIL: CPT

## 2024-10-01 PROCEDURE — 99205 OFFICE O/P NEW HI 60 MIN: CPT | Performed by: SURGERY

## 2024-10-01 RX ORDER — SODIUM CHLORIDE, SODIUM LACTATE, POTASSIUM CHLORIDE, CALCIUM CHLORIDE 600; 310; 30; 20 MG/100ML; MG/100ML; MG/100ML; MG/100ML
125 INJECTION, SOLUTION INTRAVENOUS CONTINUOUS
OUTPATIENT
Start: 2024-10-01

## 2024-10-01 NOTE — PROGRESS NOTES
In basket message received from pathology.  Patient had recent breast biopsy.  Results came back positive for breast cancer.  Reflex testing to M[facility] to be ordered by pathology based upon established criteria.          Information needed for MammaPrint Test Requisition Form:      Filipe Status:  Was there a lymph node biopsied?  no  [] NX-not submitted or found  [] N0-negative  [] N1-1-3 nodes  []N2 or N3-greater than or equal to 4 nodes     Was ultrasound/imaging of axilla performed? no       Tumor Information:  Tumor Size: (based upon breast ultrasound-largest tumor, if more than one)  [x] Less than or equal to 5 cm  [] Greater than or equal to 5 cm      Ordering Physician: Dr. Laly Qiu  Phone #: [phone]  NPI #: [id number]  Fax #: [phone]      Information sent to pathologist as well as referring physician and staff.

## 2024-10-02 ENCOUNTER — PATIENT OUTREACH (OUTPATIENT)
Dept: HEMATOLOGY ONCOLOGY | Facility: CLINIC | Age: 64
End: 2024-10-02

## 2024-10-02 ENCOUNTER — TELEPHONE (OUTPATIENT)
Age: 64
End: 2024-10-02

## 2024-10-02 DIAGNOSIS — N39.0 URINARY TRACT INFECTION: Primary | ICD-10-CM

## 2024-10-02 RX ORDER — SULFAMETHOXAZOLE/TRIMETHOPRIM 800-160 MG
1 TABLET ORAL EVERY 12 HOURS SCHEDULED
Qty: 14 TABLET | Refills: 0 | Status: SHIPPED | OUTPATIENT
Start: 2024-10-02 | End: 2024-10-09

## 2024-10-02 NOTE — RESULT ENCOUNTER NOTE
UA is positive for infection and with her symptoms, I recommend treating before the culture results. Let's follow up the culture to see what it grows and make sure the antibiotics I pick are okay. Please update patient, I am sending in 1 week of bactrim DS BID. Please ask the patient how she is doing after the abx are finished to make sure symptoms resolve.

## 2024-10-02 NOTE — PROGRESS NOTES
I reached out and spoke with Gardenia now that consults have been completed with the oncology teams to review for any barriers to care and offer supportive services as needed. Distress Thermometer completed at this time. Patient scored 2/10. Based on responses to DT, no indication for referral to SW needed at this time.  I reviewed and updated the members assigned to the care team in [facility].   She knows the members of the care team as well as how and when to contact them with any needs.   She verbalizes managing the schedules well.   She is currently able to drive and denies any transportation needs.    She denies any uncontrolled symptoms. Discussed role of Palliative Care in symptom and side effect management. Declined referral at this time.  Patients states that she is eating and drinking as per usual with no unintentional weight loss.     Patient is currently smoking. We reviewed the smoking cessation program. I offered to place a referral but patient declines at this time.    Pt is a former smoker but currently she is vaping.  Patient states she is well supported by family and friends.    Patient feels she has adequate insurance coverage and denies any financial concerns at this time.     Based on individual needs I will follow up in about 4-6 weeks.   I have provided my direct contact information and welcome them to contact me if their needs as discussed above change. They were appreciative for the call.

## 2024-10-03 LAB — BACTERIA UR CULT: ABNORMAL

## 2024-10-07 ENCOUNTER — OFFICE VISIT (OUTPATIENT)
Dept: LAB | Facility: HOSPITAL | Age: 64
End: 2024-10-07
Payer: COMMERCIAL

## 2024-10-07 DIAGNOSIS — C50.911 INVASIVE DUCTAL CARCINOMA OF BREAST, FEMALE, RIGHT (HCC): ICD-10-CM

## 2024-10-07 PROCEDURE — 93005 ELECTROCARDIOGRAM TRACING: CPT

## 2024-10-07 NOTE — TELEPHONE ENCOUNTER
Pt called stating she missed a call from the office.  I didn't see any notes but I did let her know Dr Qiu sent a script to her pharmacy for her.  She said she was waiting for someone to contact her to schedule an ultra sound.  I told her I see the script in her chart and I gave her the phone number for central scheduling  
See result note..  
No

## 2024-10-08 LAB
ATRIAL RATE: 0 BPM
ATRIAL RATE: 75 BPM
P AXIS: 59 DEGREES
PR INTERVAL: 130 MS
QRS AXIS: 0 DEGREES
QRS AXIS: 19 DEGREES
QRSD INTERVAL: 0 MS
QRSD INTERVAL: 100 MS
QT INTERVAL: 0 MS
QT INTERVAL: 412 MS
QTC INTERVAL: 0 MS
QTC INTERVAL: 460 MS
T WAVE AXIS: 0 DEGREES
T WAVE AXIS: 31 DEGREES
VENTRICULAR RATE: 0 BPM
VENTRICULAR RATE: 75 BPM

## 2024-10-08 PROCEDURE — 93010 ELECTROCARDIOGRAM REPORT: CPT | Performed by: INTERNAL MEDICINE

## 2024-10-08 NOTE — PROGRESS NOTES
Call placed to patient regarding recommendation for;    __X___ RIGHT ______LEFT      __X___SAVI  placement.    Procedure explained to patient, additional questions answered at this time    __X___Verbalized understanding.      Blood thinners:  _____yes __X___no    Date stopped: ___N/A____    All teaching points discussed during call, pt with no questions at this time, pt adv to arrive at 0830 for 0900 insertion    Pt given name/# for any further questions/needs

## 2024-10-09 ENCOUNTER — TELEPHONE (OUTPATIENT)
Dept: GENETICS | Facility: CLINIC | Age: 64
End: 2024-10-09

## 2024-10-09 NOTE — TELEPHONE ENCOUNTER
STAT Genetic Test Result    Summary:    I spoke with Gardenia to review the result of her STAT genetic test.    Initial Test: [facility] BRCAplus STAT Panel (13 genes): RADHA, BARD1, BRCA1, BRCA2, CDH1, CHEK2, NF1, PALB2, PTEN, RAD51C, RAD51D, STK11, TP53     Result: Negative - No Clinically Significant Variants Detected      Assessment:     Negative   A negative result significantly reduces the likelihood that Gardenia has a hereditary cancer syndrome related to the high-risk breast cancer genes listed above.      This result does not have surgical implications and surgical options should be discussed with her healthcare provider.        Additional Testing:  [facility] CustomNext: Cancer+RNAinsight (59 genes): APC, RADHA, AXIN2, BAP1, BARD1, BMPR1A, BRCA1, BRCA2, BRIP1, CDH1, CDK4, CDKN1B, CDKN2A, CHEK2, CTNNA1, DICER1, EGLN1, EPCAM, FH, FLCN, GREM1, HOXB13, KIF1B, KIT, MAX, MEN1, MET, MITF, MLH1, MSH2, MSH3, MSH6, MUTYH, NF1, NTHL1, PALB2, PDGFRA PMS2, POLD1, POLE, POT1, PTEN, RAD51C, RAD51D, RB1, RET, SDHA, SDHAF2, SDHB, SDHC, SDHD, SMAD4, SMARCA4, STK11, [id number], TP53, TSC1, TSC2, VHL test is pending.  We will contact Gardenia once results are available.  Additional recommendations for surveillance/medical management will be made upon final genetic test result.         Gardenia's surgeon Dr. Qiu  was made aware of this test result.

## 2024-10-11 ENCOUNTER — TELEPHONE (OUTPATIENT)
Dept: GENETICS | Facility: CLINIC | Age: 64
End: 2024-10-11

## 2024-10-11 NOTE — TELEPHONE ENCOUNTER
Genetic Test Result Note    Summary:    Result Disclosure:   Today I spoke with Gardenia over the phone to review the results of her genetic test for hereditary cancer. She underwent genetic testing through our program on 9/24/2024 due to her recent diagnosis of breast cancer.    A separate report of her STAT genetic test results were disclosed to her on 10/9/2024. This result includes new genes and does not change her initial genetic test result.     Test Performed: [facility] BRCAplus STAT Panel (13 genes): RADHA, BARD1, BRCA1, BRCA2, CDH1, CHEK2, NF1, PALB2, PTEN, RAD51C, RAD51D, STK11, TP53 with reflex to [facility] CustomNext: Cancer+RNAinsight (59 genes): APC, RADHA, AXIN2, BAP1, BARD1, BMPR1A, BRCA1, BRCA2, BRIP1, CDH1, CDK4, CDKN1B, CDKN2A, CHEK2, CTNNA1, DICER1, EGLN1, EPCAM, FH, FLCN, GREM1, HOXB13, KIF1B, KIT, MAX, MEN1, MET, MITF, MLH1, MSH2, MSH3, MSH6, MUTYH, NF1, NTHL1, PALB2, PDGFRA PMS2, POLD1, POLE, POT1, PTEN, RAD51C, RAD51D, RB1, RET, SDHA, SDHAF2, SDHB, SDHC, SDHD, SMAD4, SMARCA4, STK11, [id number], TP53, TSC1, TSC2, VHL    Result: Negative - No Clinically Significant Variants Detected     Assessment:   A negative result significantly reduces the likelihood that Gardenia has a hereditary cancer syndrome. However, this testing is unable to completely rule out the presence of hereditary cancer. It remains possible that:  There is a variant in an area of a gene which was not tested or there is a variant not detectable due to technical limitations of this test.     There is a variant in another gene that was not included in this test or in a gene not known to be linked to cancer or tumors.   A family member has a genetic variant that the patient did not inherit.   The cancer in the family is sporadic and is related to non-hereditary factors.     Risks and Testing for Family Members:  Gardenia was made aware that all of her first-degree relatives are at increased risk to develop breast cancer based on her recent diagnosis  and family history of breast cancer.. We recommend that her first-degree relatives make their healthcare providers aware of the family history and discuss their options for screening and risk-reduction.    At this time we do not recommend testing for Gardenia 's children based on her negative test result.  Gardenia Shahids children still need to consider the history of cancer on the other side of their family when determining their risks.     If Gardenia has any affected family members with a cancer diagnosis, especially at a young age, they may still consider genetic testing. Relatives who wish to pursue genetic testing can reach out to the Cancer Risk and Genetics Program at [phone] to schedule an appointment or visit [email or web address] to identify a local genetic counselor.       Plan:     Negative Result: This result does not have surgical implications and surgical options should be discussed with her healthcare provider. Gardenia's surgeon, Dr. Qiu will be made aware of her results

## 2024-10-15 ENCOUNTER — TELEPHONE (OUTPATIENT)
Dept: SURGERY | Facility: CLINIC | Age: 64
End: 2024-10-15

## 2024-10-15 ENCOUNTER — HOSPITAL ENCOUNTER (OUTPATIENT)
Dept: ULTRASOUND IMAGING | Facility: HOSPITAL | Age: 64
Discharge: HOME | End: 2024-10-15
Attending: SURGERY

## 2024-10-15 PROBLEM — C50.911 INVASIVE DUCTAL CARCINOMA OF BREAST, FEMALE, RIGHT (HCC): Status: ACTIVE | Noted: 2024-10-15

## 2024-10-15 PROBLEM — R30.0 DYSURIA: Status: ACTIVE | Noted: 2024-10-15

## 2024-10-15 NOTE — TELEPHONE ENCOUNTER
Placed call to patient and reviewed, she will be dropping off FMLA forms to this office to be filled out. No questions at this time.

## 2024-10-15 NOTE — TELEPHONE ENCOUNTER
Mammaprint results reviewed, tumor is luminal A type, low risk. Please update the patient with the good news. Her tumor genomic testing resulted with excellent prognosis and unlikely any benefit or role for chemotherapy. This will be finalized at her medical oncology visit postoperatively but strong likelihood that oral endocrine therapy will be used in her care to prevent recurrence but chemotherapy will not likely be indicated.    Remind her of the 10/29  and [facility] appointment and working OR date.    Please also fax my consult note to her PCP (Rozina) and GYN (Patricia), thanks.    She is overdue on colonoscopy and willing to schedule once her breast cancer care is completed, we can remind her of this at her postoperative visit if you want to make a note for us in the appointment notes, thanks.
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
## 2024-10-18 ENCOUNTER — DOCUMENTATION (OUTPATIENT)
Dept: HEMATOLOGY ONCOLOGY | Facility: CLINIC | Age: 64
End: 2024-10-18

## 2024-10-18 NOTE — PROGRESS NOTES
Received email from [facility] requesting additional information for patients mammaprint order:    Thank you for submitting an order for MammaPrint testing. We need additional medical records on the following patient for Prior Authorization:  Patient Last Name - Dale  YOB: 1960  Order Number - [id number]    Currently, we are missing marisabel status.  Please provide the following records:  - Imaging Reports (MRI)  - Consultation Reports/Progress Notes  - Surgical Pathology Reports (Lumpectomy, Mastectomy, Excisional)    Please keep in mind that the Core Biopsy Pathology Report is NOT sufficient to identify tumor size (cm).       Please fax records to [phone] or email to [email or web address]

## 2024-10-29 ENCOUNTER — HOSPITAL ENCOUNTER (OUTPATIENT)
Dept: ULTRASOUND IMAGING | Facility: HOSPITAL | Age: 64
Discharge: HOME | End: 2024-10-29
Attending: SURGERY
Payer: COMMERCIAL

## 2024-10-29 ENCOUNTER — HOSPITAL ENCOUNTER (OUTPATIENT)
Dept: MAMMOGRAPHY | Facility: HOSPITAL | Age: 64
Discharge: HOME | End: 2024-10-29
Payer: COMMERCIAL

## 2024-10-29 VITALS — SYSTOLIC BLOOD PRESSURE: 150 MMHG | DIASTOLIC BLOOD PRESSURE: 84 MMHG | OXYGEN SATURATION: 99 % | HEART RATE: 75 BPM

## 2024-10-29 DIAGNOSIS — R93.89 ABNORMAL ULTRASOUND: ICD-10-CM

## 2024-10-29 DIAGNOSIS — C50.911 INVASIVE DUCTAL CARCINOMA OF BREAST, FEMALE, RIGHT (HCC): ICD-10-CM

## 2024-10-29 PROCEDURE — A4648 IMPLANTABLE TISSUE MARKER: HCPCS

## 2024-10-29 PROCEDURE — 19285 PERQ DEV BREAST 1ST US IMAG: CPT

## 2024-10-29 PROCEDURE — 76642 ULTRASOUND BREAST LIMITED: CPT

## 2024-10-29 RX ORDER — LIDOCAINE HYDROCHLORIDE 10 MG/ML
5 INJECTION, SOLUTION EPIDURAL; INFILTRATION; INTRACAUDAL; PERINEURAL ONCE
Status: COMPLETED | OUTPATIENT
Start: 2024-10-29 | End: 2024-10-29

## 2024-10-29 RX ADMIN — LIDOCAINE HYDROCHLORIDE 5 ML: 10 INJECTION, SOLUTION EPIDURAL; INFILTRATION; INTRACAUDAL; PERINEURAL at 09:28

## 2024-10-29 NOTE — PROGRESS NOTES
Procedure type:    ___x__ultrasound guided _____stereotactic    Breast:    _____Left ___x__Right    Location: 2 o'clock, 3 cmfn    Clip: Reyna    Performed by: Katy    Pressure held for 5 minutes by: Jayshree [place] RN    Steri Strips:    ___X__yes _____no    Band aid:    __X___yes_____no    Tolerated procedure:    __X___yes _____no

## 2024-10-30 NOTE — PROGRESS NOTES
Outreach call made to patient to follow up on radiation question, adv pt that she will need to drive to [place] for radiation treatments, discussion held with patient on skin changes during radiation as well as feeling tired after treatments, pt thankful for information and grateful for follow up, has name/# for any additional needs

## 2024-10-30 NOTE — PROGRESS NOTES
Post procedure call completed    Bleeding: _____yes __X___no (pt denies)    Pain: _____yes ___X___no (pt with soreness, used ice, no need for OTC pain meds)    Redness/Swelling: ______yes ___X___no (pt denies)    Band aid removed: ___X__yes _____no     Pt with questions about where she will need to go to get her radiation after her surgery, adv I was not sure about this but I would call her back with an answer to her question when I found and answer, pt appreciative of call, adv to call with any questions or concerns, has name/# for contact

## 2024-10-31 NOTE — PRE-PROCEDURE INSTRUCTIONS
No outpatient medications have been marked as taking for the 11/8/24 encounter (Hospital Encounter).   Per patient, she does not take any medications at this time.     Medication instructions for day surgery reviewed. Please use only a sip of water to take your instructed medications. Avoid all over the counter vitamins, supplements and NSAIDS for one week prior to surgery per anesthesia guidelines. Tylenol is ok to take as needed.     You will receive a call one business day prior to surgery with an arrival time and hospital directions. If your surgery is scheduled on a Monday, the hospital will be calling you on the Friday prior to your surgery. If you have not heard from anyone by 8pm, please call the hospital supervisor through the hospital  at [phone]. ([place] [phone] or [place] [phone]).    Do not eat or drink anything after midnight the night before your surgery, including candy, mints, lifesavers, or chewing gum. Do not drink alcohol 24hrs before your surgery. Try not to smoke at least 24hrs before your surgery.       Follow the pre surgery showering instructions as listed in the “My Surgical Experience Booklet” or otherwise provided by your surgeon's office. Do not use a blade to shave the surgical area 1 week before surgery. It is okay to use a clean electric clippers up to 24 hours before surgery. Do not apply any lotions, creams, including makeup, cologne, deodorant, or perfumes after showering on the day of your surgery. Do not use dry shampoo, hair spray, hair gel, or any type of hair products.     No contact lenses, eye make-up, or artificial eyelashes. Remove nail polish, including gel polish, and any artificial, gel, or acrylic nails if possible. Remove all jewelry including rings and body piercing jewelry.     Wear causal clothing that is easy to take on and off. Consider your type of surgery.    Keep any valuables, jewelry, piercings at home. Please bring any specially  ordered equipment (sling, braces) if indicated.    Arrange for a responsible person to drive you to and from the hospital on the day of your surgery. Please confirm the visitor policy for the day of your procedure when you receive your phone call with an arrival time.     Call the surgeon's office with any new illnesses, exposures, or additional questions prior to surgery.    Please reference your “My Surgical Experience Booklet” for additional information to prepare for your upcoming surgery.

## 2024-11-08 ENCOUNTER — ANESTHESIA (OUTPATIENT)
Dept: PERIOP | Facility: HOSPITAL | Age: 64
End: 2024-11-08
Payer: COMMERCIAL

## 2024-11-08 ENCOUNTER — APPOINTMENT (OUTPATIENT)
Dept: RADIOLOGY | Facility: HOSPITAL | Age: 64
End: 2024-11-08
Payer: COMMERCIAL

## 2024-11-08 ENCOUNTER — ANESTHESIA EVENT (OUTPATIENT)
Dept: PERIOP | Facility: HOSPITAL | Age: 64
End: 2024-11-08
Payer: COMMERCIAL

## 2024-11-08 ENCOUNTER — HOSPITAL ENCOUNTER (OUTPATIENT)
Dept: NUCLEAR MEDICINE | Facility: HOSPITAL | Age: 64
End: 2024-11-08
Attending: SURGERY
Payer: COMMERCIAL

## 2024-11-08 ENCOUNTER — HOSPITAL ENCOUNTER (OUTPATIENT)
Facility: HOSPITAL | Age: 64
Setting detail: OUTPATIENT SURGERY
Discharge: HOME | End: 2024-11-08
Attending: SURGERY | Admitting: SURGERY
Payer: COMMERCIAL

## 2024-11-08 VITALS
DIASTOLIC BLOOD PRESSURE: 68 MMHG | HEIGHT: 63 IN | HEART RATE: 76 BPM | SYSTOLIC BLOOD PRESSURE: 127 MMHG | RESPIRATION RATE: 16 BRPM | BODY MASS INDEX: 33.66 KG/M2 | TEMPERATURE: 97.6 F | WEIGHT: 190 LBS | OXYGEN SATURATION: 95 %

## 2024-11-08 DIAGNOSIS — C50.911 INVASIVE DUCTAL CARCINOMA OF BREAST, FEMALE, RIGHT (HCC): ICD-10-CM

## 2024-11-08 PROCEDURE — 38525 BIOPSY/REMOVAL LYMPH NODES: CPT | Performed by: SURGERY

## 2024-11-08 PROCEDURE — 19301 PARTIAL MASTECTOMY: CPT | Performed by: SURGERY

## 2024-11-08 PROCEDURE — 19301 PARTIAL MASTECTOMY: CPT

## 2024-11-08 PROCEDURE — 38792 RA TRACER ID OF SENTINL NODE: CPT | Performed by: SURGERY

## 2024-11-08 PROCEDURE — 88342 IMHCHEM/IMCYTCHM 1ST ANTB: CPT | Performed by: STUDENT IN AN ORGANIZED HEALTH CARE EDUCATION/TRAINING PROGRAM

## 2024-11-08 PROCEDURE — NC001 PR NO CHARGE: Performed by: SURGERY

## 2024-11-08 PROCEDURE — 38525 BIOPSY/REMOVAL LYMPH NODES: CPT

## 2024-11-08 PROCEDURE — 38792 RA TRACER ID OF SENTINL NODE: CPT

## 2024-11-08 PROCEDURE — 88341 IMHCHEM/IMCYTCHM EA ADD ANTB: CPT | Performed by: STUDENT IN AN ORGANIZED HEALTH CARE EDUCATION/TRAINING PROGRAM

## 2024-11-08 PROCEDURE — 88307 TISSUE EXAM BY PATHOLOGIST: CPT | Performed by: STUDENT IN AN ORGANIZED HEALTH CARE EDUCATION/TRAINING PROGRAM

## 2024-11-08 PROCEDURE — A9541 TC99M SULFUR COLLOID: HCPCS

## 2024-11-08 RX ORDER — EPHEDRINE SULFATE 50 MG/ML
INJECTION INTRAVENOUS AS NEEDED
Status: DISCONTINUED | OUTPATIENT
Start: 2024-11-08 | End: 2024-11-08

## 2024-11-08 RX ORDER — TRAMADOL HYDROCHLORIDE 50 MG/1
50 TABLET ORAL EVERY 6 HOURS PRN
Qty: 15 TABLET | Refills: 0 | Status: SHIPPED | OUTPATIENT
Start: 2024-11-08 | End: 2024-11-18

## 2024-11-08 RX ORDER — ACETAMINOPHEN 325 MG/1
TABLET ORAL
Status: DISPENSED
Start: 2024-11-08 | End: 2024-11-08

## 2024-11-08 RX ORDER — LIDOCAINE HYDROCHLORIDE 20 MG/ML
INJECTION, SOLUTION EPIDURAL; INFILTRATION; INTRACAUDAL; PERINEURAL AS NEEDED
Status: DISCONTINUED | OUTPATIENT
Start: 2024-11-08 | End: 2024-11-08

## 2024-11-08 RX ORDER — LIDOCAINE HYDROCHLORIDE AND EPINEPHRINE 10; 10 MG/ML; UG/ML
INJECTION, SOLUTION INFILTRATION; PERINEURAL AS NEEDED
Status: DISCONTINUED | OUTPATIENT
Start: 2024-11-08 | End: 2024-11-08 | Stop reason: HOSPADM

## 2024-11-08 RX ORDER — MEPERIDINE HYDROCHLORIDE 50 MG/ML
12.5 INJECTION INTRAMUSCULAR; INTRAVENOUS; SUBCUTANEOUS
Status: DISCONTINUED | OUTPATIENT
Start: 2024-11-08 | End: 2024-11-08 | Stop reason: HOSPADM

## 2024-11-08 RX ORDER — TRAMADOL HYDROCHLORIDE 50 MG/1
50 TABLET ORAL EVERY 6 HOURS PRN
Status: DISCONTINUED | OUTPATIENT
Start: 2024-11-08 | End: 2024-11-08 | Stop reason: HOSPADM

## 2024-11-08 RX ORDER — KETOROLAC TROMETHAMINE 30 MG/ML
INJECTION, SOLUTION INTRAMUSCULAR; INTRAVENOUS AS NEEDED
Status: DISCONTINUED | OUTPATIENT
Start: 2024-11-08 | End: 2024-11-08

## 2024-11-08 RX ORDER — ACETAMINOPHEN 325 MG/1
650 TABLET ORAL EVERY 6 HOURS PRN
Status: DISCONTINUED | OUTPATIENT
Start: 2024-11-08 | End: 2024-11-08 | Stop reason: HOSPADM

## 2024-11-08 RX ORDER — FENTANYL CITRATE/PF 50 MCG/ML
25 SYRINGE (ML) INJECTION
Status: DISCONTINUED | OUTPATIENT
Start: 2024-11-08 | End: 2024-11-08 | Stop reason: HOSPADM

## 2024-11-08 RX ORDER — BUPIVACAINE HYDROCHLORIDE 5 MG/ML
INJECTION, SOLUTION EPIDURAL; INTRACAUDAL AS NEEDED
Status: DISCONTINUED | OUTPATIENT
Start: 2024-11-08 | End: 2024-11-08 | Stop reason: HOSPADM

## 2024-11-08 RX ORDER — HYDROMORPHONE HCL/PF 1 MG/ML
SYRINGE (ML) INJECTION AS NEEDED
Status: DISCONTINUED | OUTPATIENT
Start: 2024-11-08 | End: 2024-11-08

## 2024-11-08 RX ORDER — ONDANSETRON 2 MG/ML
4 INJECTION INTRAMUSCULAR; INTRAVENOUS EVERY 6 HOURS PRN
Status: DISCONTINUED | OUTPATIENT
Start: 2024-11-08 | End: 2024-11-08 | Stop reason: HOSPADM

## 2024-11-08 RX ORDER — DEXAMETHASONE SODIUM PHOSPHATE 10 MG/ML
INJECTION, SOLUTION INTRAMUSCULAR; INTRAVENOUS AS NEEDED
Status: DISCONTINUED | OUTPATIENT
Start: 2024-11-08 | End: 2024-11-08

## 2024-11-08 RX ORDER — GLYCOPYRROLATE 0.2 MG/ML
INJECTION INTRAMUSCULAR; INTRAVENOUS AS NEEDED
Status: DISCONTINUED | OUTPATIENT
Start: 2024-11-08 | End: 2024-11-08

## 2024-11-08 RX ORDER — MAGNESIUM HYDROXIDE 1200 MG/15ML
LIQUID ORAL AS NEEDED
Status: DISCONTINUED | OUTPATIENT
Start: 2024-11-08 | End: 2024-11-08 | Stop reason: HOSPADM

## 2024-11-08 RX ORDER — ONDANSETRON 2 MG/ML
INJECTION INTRAMUSCULAR; INTRAVENOUS AS NEEDED
Status: DISCONTINUED | OUTPATIENT
Start: 2024-11-08 | End: 2024-11-08

## 2024-11-08 RX ORDER — PROPOFOL 10 MG/ML
INJECTION, EMULSION INTRAVENOUS AS NEEDED
Status: DISCONTINUED | OUTPATIENT
Start: 2024-11-08 | End: 2024-11-08

## 2024-11-08 RX ORDER — PROMETHAZINE HYDROCHLORIDE 25 MG/ML
12.5 INJECTION, SOLUTION INTRAMUSCULAR; INTRAVENOUS ONCE AS NEEDED
Status: DISCONTINUED | OUTPATIENT
Start: 2024-11-08 | End: 2024-11-08 | Stop reason: HOSPADM

## 2024-11-08 RX ORDER — FENTANYL CITRATE 50 UG/ML
INJECTION, SOLUTION INTRAMUSCULAR; INTRAVENOUS AS NEEDED
Status: DISCONTINUED | OUTPATIENT
Start: 2024-11-08 | End: 2024-11-08

## 2024-11-08 RX ORDER — HYDROMORPHONE HCL/PF 1 MG/ML
0.5 SYRINGE (ML) INJECTION
Status: DISCONTINUED | OUTPATIENT
Start: 2024-11-08 | End: 2024-11-08 | Stop reason: HOSPADM

## 2024-11-08 RX ORDER — MIDAZOLAM HYDROCHLORIDE 2 MG/2ML
INJECTION, SOLUTION INTRAMUSCULAR; INTRAVENOUS AS NEEDED
Status: DISCONTINUED | OUTPATIENT
Start: 2024-11-08 | End: 2024-11-08

## 2024-11-08 RX ORDER — ALBUTEROL SULFATE 0.83 MG/ML
2.5 SOLUTION RESPIRATORY (INHALATION) ONCE AS NEEDED
Status: DISCONTINUED | OUTPATIENT
Start: 2024-11-08 | End: 2024-11-08 | Stop reason: HOSPADM

## 2024-11-08 RX ORDER — CEFAZOLIN SODIUM 2 G/50ML
2000 SOLUTION INTRAVENOUS ONCE
Status: COMPLETED | OUTPATIENT
Start: 2024-11-08 | End: 2024-11-08

## 2024-11-08 RX ORDER — ONDANSETRON 2 MG/ML
4 INJECTION INTRAMUSCULAR; INTRAVENOUS ONCE AS NEEDED
Status: DISCONTINUED | OUTPATIENT
Start: 2024-11-08 | End: 2024-11-08 | Stop reason: HOSPADM

## 2024-11-08 RX ORDER — SODIUM CHLORIDE, SODIUM LACTATE, POTASSIUM CHLORIDE, CALCIUM CHLORIDE 600; 310; 30; 20 MG/100ML; MG/100ML; MG/100ML; MG/100ML
125 INJECTION, SOLUTION INTRAVENOUS CONTINUOUS
Status: DISCONTINUED | OUTPATIENT
Start: 2024-11-08 | End: 2024-11-08 | Stop reason: HOSPADM

## 2024-11-08 RX ADMIN — HYDROMORPHONE HYDROCHLORIDE 1 MG: 1 INJECTION, SOLUTION INTRAMUSCULAR; INTRAVENOUS; SUBCUTANEOUS at 09:29

## 2024-11-08 RX ADMIN — TRAMADOL HYDROCHLORIDE 50 MG: 50 TABLET, COATED ORAL at 11:55

## 2024-11-08 RX ADMIN — GLYCOPYRROLATE 0.1 MCG: 0.2 INJECTION, SOLUTION INTRAMUSCULAR; INTRAVENOUS at 08:55

## 2024-11-08 RX ADMIN — ONDANSETRON 4 MG: 2 INJECTION INTRAMUSCULAR; INTRAVENOUS at 09:02

## 2024-11-08 RX ADMIN — LIDOCAINE HYDROCHLORIDE 100 MG: 20 INJECTION, SOLUTION EPIDURAL; INFILTRATION; INTRACAUDAL; PERINEURAL at 09:03

## 2024-11-08 RX ADMIN — ONDANSETRON 4 MG: 2 INJECTION INTRAMUSCULAR; INTRAVENOUS at 08:55

## 2024-11-08 RX ADMIN — SODIUM CHLORIDE, SODIUM LACTATE, POTASSIUM CHLORIDE, AND CALCIUM CHLORIDE: .6; .31; .03; .02 INJECTION, SOLUTION INTRAVENOUS at 08:27

## 2024-11-08 RX ADMIN — CEFAZOLIN SODIUM 2000 MG: 2 SOLUTION INTRAVENOUS at 09:07

## 2024-11-08 RX ADMIN — MIDAZOLAM 2 MG: 1 INJECTION INTRAMUSCULAR; INTRAVENOUS at 08:54

## 2024-11-08 RX ADMIN — PROPOFOL 200 MG: 10 INJECTION, EMULSION INTRAVENOUS at 09:03

## 2024-11-08 RX ADMIN — EPHEDRINE SULFATE 10 MG: 50 INJECTION, SOLUTION INTRAVENOUS at 09:20

## 2024-11-08 RX ADMIN — FENTANYL CITRATE 100 MCG: 50 INJECTION, SOLUTION INTRAMUSCULAR; INTRAVENOUS at 09:00

## 2024-11-08 RX ADMIN — DEXAMETHASONE SODIUM PHOSPHATE 10 MG: 10 INJECTION, SOLUTION INTRAMUSCULAR; INTRAVENOUS at 09:04

## 2024-11-08 RX ADMIN — KETOROLAC TROMETHAMINE 15 MG: 30 INJECTION, SOLUTION INTRAMUSCULAR at 10:40

## 2024-11-08 RX ADMIN — PROPOFOL 80 MCG/KG/MIN: 10 INJECTION, EMULSION INTRAVENOUS at 09:06

## 2024-11-08 RX ADMIN — ACETAMINOPHEN 650 MG: 325 TABLET ORAL at 11:55

## 2024-11-08 RX ADMIN — PROPOFOL 80 MCG/KG/MIN: 10 INJECTION, EMULSION INTRAVENOUS at 10:00

## 2024-11-08 NOTE — ANESTHESIA PREPROCEDURE EVALUATION
Procedure:  LUMPECTOMY BREAST WITH DAVIDE  LOCALIZATION, BIOPSY LYMPH NODE SENTINEL (INJECT AT 0900 BY DR SHEN IN THE OR) (Right: Breast)    Relevant Problems   No relevant active problems        Physical Exam    Airway    Mallampati score: I  TM Distance: >3 FB  Neck ROM: full     Dental       Cardiovascular  Cardiovascular exam normal    Pulmonary  Pulmonary exam normal     Other Findings  post-pubertal.      Anesthesia Plan  ASA Score- 2     Anesthesia Type- general with ASA Monitors.         Additional Monitors:     Airway Plan: ETT.           Plan Factors-Exercise tolerance (METS): >4 METS.    Chart reviewed. EKG reviewed. Imaging results reviewed. Existing labs reviewed. Patient summary reviewed.    Patient is not a current smoker.  Patient did not smoke on day of surgery.    Obstructive sleep apnea risk education given perioperatively.        Induction- intravenous.    Postoperative Plan- Plan for postoperative opioid use. Planned trial extubation        Informed Consent- Anesthetic plan and risks discussed with patient.  I personally reviewed this patient with the CRNA. Discussed and agreed on the Anesthesia Plan with the CRNA..

## 2024-11-08 NOTE — DISCHARGE INSTR - AVS FIRST PAGE
You may resume all home medications.   Your incisions were closed with glue and covered with a dressing. The dressing can be removed on Sunday and then you may shower. Do not scrub or submerge incisions.   You should wear the surgical bra with padding until you are seen for your postop visit with Dr Qiu.  You should take tylenol/ibuprofen every 4-6 hours for pain control. You may take tramadol as needed for severe pain.   Activity as tolerated but take it easy for the next week.   You have a follow up appointment scheduled 11/21/24 with Dr Qiu.  Please call the office with any questions/concerns.
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
## 2024-11-08 NOTE — ANESTHESIA POSTPROCEDURE EVALUATION
Post-Op Assessment Note            No anethesia notable event occurred.            Last Filed PACU Vitals:  Vitals Value Taken Time   Temp 97 °F (36.1 °C) 11/08/24 1059   Pulse 79 11/08/24 1134   /58 11/08/24 1130   Resp 14 11/08/24 1134   SpO2 95 % 11/08/24 1134   Vitals shown include unfiled device data.    Modified Alber:  Activity: 2 (11/8/2024 12:38 PM)  Respiration: 2 (11/8/2024 12:38 PM)  Circulation: 2 (11/8/2024 12:38 PM)  Consciousness: 2 (11/8/2024 12:38 PM)  Oxygen Saturation: 2 (11/8/2024 12:38 PM)  Modified Alber Score: 10 (11/8/2024 12:38 PM)

## 2024-11-08 NOTE — ANESTHESIA POSTPROCEDURE EVALUATION
Post-Op Assessment Note    CV Status:  Stable  Pain Score: 2    Pain management: adequate       Mental Status:  Sleepy and arousable   Hydration Status:  Euvolemic   PONV Controlled:  Controlled   Airway Patency:  Patent     Post Op Vitals Reviewed: Yes    No anethesia notable event occurred.    Staff: CRNA           Last Filed PACU Vitals:  Vitals Value Taken Time   Temp 97    Pulse 85    /64    Resp 18    SpO2 98        Modified Alber:  No data recorded

## 2024-11-08 NOTE — OP NOTE
OPERATIVE REPORT  PATIENT NAME: Gardenia Hunter    :  1960  MRN: [id number]  Pt Location: [facility] OR ROOM 01    SURGERY DATE: 2024    Surgeons and Role:     * Laly Shen MD - Primary     * Jaylin Coles PA-C - Assisting    Preop Diagnosis:  Invasive ductal carcinoma of breast, female, right (HCC) [C50.911]    Post-Op Diagnosis Codes:     * Invasive ductal carcinoma of breast, female, right (HCC) [C50.911]    Procedure(s):  Right - LUMPECTOMY BREAST WITH JALEN  LOCALIZATION. BIOPSY LYMPH NODE SENTINEL (INJECT AT 0900 BY DR SHEN IN THE OR)    Specimen(s):  ID Type Source Tests Collected by Time Destination   1 : RIGHT BREAST LUMPECTOMY Tissue Breast, Right TISSUE EXAM Laly Shen MD 2024 0944    2 : RIGHT AXILLARY SENTINEL LYMPH NODE #1 Tissue Axillary lymph node TISSUE EXAM Laly Shen MD 2024 1007    3 : RIGHT AXILLARY SENTINEL LYMPH NODE #2 Tissue Axillary lymph node TISSUE EXAM Laly Shen MD 2024 1018    4 : RIGHT AXILLARY SENTINEL LYMPH NODE #3 Tissue Axillary lymph node TISSUE EXAM Laly Shen MD 2024 1022        Estimated Blood Loss:   Minimal    Anesthesia Type:   General  Local    Operative Indications:  Invasive ductal carcinoma of breast, female, right (HCC) [C50.911]      Operative Findings:  -Two biopsy clips, jalen reflector, palpable mass located centrally within the specimen on specimen imaging  -Specimen inked per protocol  -Dissection taken down to chest wall  -Cavity clipped with marking clips  -Three radioactive marisabel specimens identified and sent separately from the right axilla      Complications:   None    Procedure and Technique:  The patient was seen in preop, right breast marked, jalen reflector functioning, taken to OR, general anesthesia induced, pneumoboots on and activated, perioperative antibiotics given. Injection of 0.55 mCi Tc-99m filtered sulfur colloid into the right breast performed. Right breast and  axilla prepped and draped in the usual sterile fashion, surgical timeout performed. Right upper inner breast with palpable mass and underlying reyna reflector activity. Curvilinear incision marked. Incision made. Dissection carried down through skin and subcutaneous tissue. Skin flaps raised circumferentially. Reyna reflector localization and palpation used to create a lumpectomy specimen down to chest wall. Specimen inked per protocol, specimen imaging obtained. Two biopsy clips, one reyna reflector, mass, all located centrally within the specimen, wound bed irrigated, hemostasis achieved, local instilled. Attention turned to right axilla, incision marked overlying area of highest radioactivity. Careful dissection into the axilla, three separate radioactive marisabel specimens identified and sent separately, labeled right axillary sentinel lymph node biopsy #1, #2, #3 respectively. Wound bed irrigated, hemostasis achieved. Local instilled. Axilla closed with running 2-0 vicryl in the fascial layer and 3-0 interrupted vicryl in the dermis, running 4-0 monocryl in the skin. Breast reinspected for hemostasis and closed with interrupted 3-0 vicryl and running 4-0 monocryl in the skin. All counts correct, both incisions cleansed and dressed with sterile dressings and a compressive surgical bra. She was awoken from anesthesia and taken to PACU in stable condition. I was present for the entire procedure and a physician assistant was required during the procedure for retraction, tissue handling, dissection and suturing.    Patient Disposition:  PACU     [place] Node Biopsy for Breast Cancer - Right  Operation performed with curative intent. Yes   Tracer(s) used to identify sentinel nodes in the upfront surgery (non-neoadjuvant) setting (select all that apply). Radioactive tracer   Tracer(s) used to identify sentinel nodes in the neoadjuvant setting (select all that apply). N/A   All nodes (colored or non-colored) present at the  end of a dye-filled lymphatic channel were removed. N/A   All significantly radioactive nodes were removed. Yes   All palpably suspicious nodes were removed. N/A   Biopsy-proven positive nodes marked with clips prior to chemotherapy were identified and removed. N/A        SIGNATURE: Laly Qiu MD  DATE: November 8, 2024  TIME: 10:50 AM

## 2024-11-09 ENCOUNTER — ANCILLARY PROCEDURE (OUTPATIENT)
Dept: LAB | Facility: HOSPITAL | Age: 64
End: 2024-11-09
Attending: SURGERY

## 2024-11-11 ENCOUNTER — TELEPHONE (OUTPATIENT)
Age: 64
End: 2024-11-11

## 2024-11-11 NOTE — TELEPHONE ENCOUNTER
Pt called in regarding the Application for Leave form pt's  dropped off at the practice. Pt states her employer has not received the paper work, yet. Pt's employer is [employer], fax [phone].

## 2024-11-14 NOTE — TELEPHONE ENCOUNTER
Placed call to patient and left a message stating it was faxed, confirmation fax was received. Call with any questions or concerns.

## 2024-11-18 ENCOUNTER — RESULTS FOLLOW-UP (OUTPATIENT)
Dept: OTHER | Facility: HOSPITAL | Age: 64
End: 2024-11-18

## 2024-11-18 DIAGNOSIS — C50.911 INVASIVE DUCTAL CARCINOMA OF BREAST, FEMALE, RIGHT (HCC): Primary | ICD-10-CM

## 2024-11-18 PROCEDURE — 88307 TISSUE EXAM BY PATHOLOGIST: CPT | Performed by: STUDENT IN AN ORGANIZED HEALTH CARE EDUCATION/TRAINING PROGRAM

## 2024-11-18 PROCEDURE — 88341 IMHCHEM/IMCYTCHM EA ADD ANTB: CPT | Performed by: STUDENT IN AN ORGANIZED HEALTH CARE EDUCATION/TRAINING PROGRAM

## 2024-11-18 PROCEDURE — 88342 IMHCHEM/IMCYTCHM 1ST ANTB: CPT | Performed by: STUDENT IN AN ORGANIZED HEALTH CARE EDUCATION/TRAINING PROGRAM

## 2024-11-18 NOTE — RESULT ENCOUNTER NOTE
Results look great, all of the cancer is removed with negative margins and negative lymph nodes, no further surgery needed, please confirm postop visit and ask how her recovery is going so far, please offer to arrange medical and radiation oncology consults at this point or we can do that after her follow up visit as well, thank you

## 2024-11-20 ENCOUNTER — DOCUMENTATION (OUTPATIENT)
Dept: HEMATOLOGY ONCOLOGY | Facility: CLINIC | Age: 64
End: 2024-11-20

## 2024-11-20 RX ORDER — VALACYCLOVIR HYDROCHLORIDE 500 MG/1
500 TABLET, FILM COATED ORAL
COMMUNITY
Start: 2024-08-28

## 2024-11-20 NOTE — PROGRESS NOTES
Referrals to medical oncology and radiation oncology received from Dr Qiu.  Chart reviewed by oncology nurse navigator at this time.      Diagnosis: right breast cancer.  Patient will see Dr Qiu post-op tomorrow 11/21/24.  After review of chart, instructions for scheduling added to referrals and sent to be scheduled as advised.

## 2024-11-21 ENCOUNTER — OFFICE VISIT (OUTPATIENT)
Dept: SURGERY | Facility: CLINIC | Age: 64
End: 2024-11-21

## 2024-11-21 VITALS
SYSTOLIC BLOOD PRESSURE: 136 MMHG | OXYGEN SATURATION: 98 % | BODY MASS INDEX: 34.02 KG/M2 | DIASTOLIC BLOOD PRESSURE: 70 MMHG | WEIGHT: 192 LBS | HEIGHT: 63 IN | TEMPERATURE: 97.2 F | HEART RATE: 82 BPM

## 2024-11-21 DIAGNOSIS — C50.911 INVASIVE DUCTAL CARCINOMA OF BREAST, FEMALE, RIGHT (HCC): Primary | ICD-10-CM

## 2024-11-21 DIAGNOSIS — N39.0 RECURRENT UTI: ICD-10-CM

## 2024-11-21 DIAGNOSIS — Z12.11 COLON CANCER SCREENING: ICD-10-CM

## 2024-11-21 PROCEDURE — 99024 POSTOP FOLLOW-UP VISIT: CPT | Performed by: SURGERY

## 2024-11-21 RX ORDER — SULFAMETHOXAZOLE AND TRIMETHOPRIM 800; 160 MG/1; MG/1
TABLET ORAL
COMMUNITY
Start: 2024-11-19

## 2024-11-22 ENCOUNTER — TELEMEDICINE (OUTPATIENT)
Dept: HEMATOLOGY ONCOLOGY | Facility: CLINIC | Age: 64
End: 2024-11-22
Payer: COMMERCIAL

## 2024-11-22 DIAGNOSIS — C50.911 INVASIVE DUCTAL CARCINOMA OF BREAST, FEMALE, RIGHT (HCC): ICD-10-CM

## 2024-11-22 DIAGNOSIS — M85.89 OSTEOPENIA OF MULTIPLE SITES: Primary | ICD-10-CM

## 2024-11-22 PROCEDURE — 99245 OFF/OP CONSLTJ NEW/EST HI 55: CPT | Performed by: INTERNAL MEDICINE

## 2024-11-22 NOTE — PROGRESS NOTES
Virtual Regular Visit  Name: Gardenia Hunter      : 1960      MRN: [id number]  Encounter Provider: Tian Galo DO  Encounter Date: 2024   Encounter department: [facility] [place]      Verification of patient location:  Patient is located at Home in the following state in which I hold an active license [place] :  Assessment & Plan  Invasive ductal carcinoma of breast, female, right (HCC)  In summary, this is a 64-year-old female with history of right breast abnormality by mammogram.  Pathology indicates T1c, N0 Infiltrating ductal carcinoma.  ER 95, SD 15, HER2 0.  BRCA1/2 negative.  We reviewed that the risk of recurrence is relatively low.  At 11 mm tumor falls at the low end of the T1c range.  Utility of Oncotype in this case is probably low.  We reviewed that the likelihood of distant recurrence is relatively low.  This probably decreases by about 3% absolutely with adjuvant hormonal blockade for 5 years or more.  We reviewed potential toxicities including but not limited to hot flashes, joint stiffness, increased risk for osteoporosis.  Note is made of DEXA scan 2020 showing osteopenia.  Repeat DEXA scan is requested we reviewed strategies to minimize risk for osteoporosis.  We reviewed that if distant recurrence occurs this can be treated although likely would not be curable.  After considering all of the above the patient wishes to think about this and let us know if she wishes to proceed with further therapy.  She is planning to proceed with radiation therapy consultation in a few days as scheduled.  At this time.    Orders:    Ambulatory Referral to Hematology / Oncology          Encounter provider iTan Galo DO    The patient was identified by name and date of birth. Gardenia FREITAS Dale was informed that this is a telemedicine visit and that the visit is being conducted through the Epic Embedded platform. She agrees to proceed..  My office  door was closed. No one else was in the room.  She acknowledged consent and understanding of privacy and security of the video platform. The patient has agreed to participate and understands they can discontinue the visit at any time.    Patient is aware this is a billable service.     History of Present Illness     HPI November 8, 2024 patient underwent right breast lumpectomy, axillary sentinel node sampling.  Pathology indicates right breast infiltrating ductal carcinoma, grade 1.  11 mm.  4/4 nodes negative = T1c, N0.  Previous biopsy showed invasive mammary carcinoma, ER 95, NH 15, HER2 0.    Review of Systems   Constitutional:  Negative for appetite change, diaphoresis, fatigue and fever.   HENT:  Negative for sinus pain.    Eyes:  Negative for discharge.   Respiratory:  Negative for cough and shortness of breath.    Cardiovascular:  Negative for chest pain.   Gastrointestinal:  Negative for abdominal pain, constipation and diarrhea.   Endocrine: Negative for cold intolerance.   Genitourinary:  Negative for difficulty urinating and hematuria.   Musculoskeletal:  Negative for joint swelling.   Skin:  Negative for rash.   Allergic/Immunologic: Negative for environmental allergies.   Neurological:  Negative for dizziness and headaches.   Hematological:  Negative for adenopathy.   Psychiatric/Behavioral:  Negative for agitation.        Objective   There were no vitals taken for this visit.    Physical Exam  Constitutional:       General: She is not in acute distress.     Appearance: She is well-developed.   HENT:      Head: Atraumatic.      Nose: No congestion.   Eyes:      Conjunctiva/sclera: Conjunctivae normal.   Pulmonary:      Effort: Pulmonary effort is normal.   Abdominal:      General: There is no distension.   Musculoskeletal:         General: No deformity.      Cervical back: Normal range of motion.   Skin:     Findings: No rash.   Neurological:      Mental Status: She is alert and oriented to person,  place, and time.         Visit Time  Total Visit Duration: 30 min.

## 2024-11-22 NOTE — LETTER
2024     Laly Qiu MD  [address] [facility]  [address]  [place] [address]    Patient: Gardenia Hunter   YOB: 1960   Date of Visit: 2024       Dear Dr. Qiu:    Thank you for referring Gardenia Hunter to me for evaluation. Below are my notes for this consultation.    If you have questions, please do not hesitate to call me. I look forward to following your patient along with you.         Sincerely,        Tian Galo,         CC: DO Tian Delgado DO  2024  2:46 PM  Sign when Signing Visit  Virtual Regular Visit  Name: Gardenia Hunter      : 1960      MRN: [id number]  Encounter Provider: Tian Galo DO  Encounter Date: 2024   Encounter department: [facility] HEMATOLOGY ONCOLOGY SPECIALISTS [place]      Verification of patient location:  Patient is located at Home in the following state in which I hold an active license [place] :  Assessment & Plan  Invasive ductal carcinoma of breast, female, right (HCC)  In summary, this is a 64-year-old female with history of right breast abnormality by mammogram.  Pathology indicates T1c, N0 Infiltrating ductal carcinoma.  ER 95, UT 15, HER2 0.  BRCA1/2 negative.  We reviewed that the risk of recurrence is relatively low.  At 11 mm tumor falls at the low end of the T1c range.  Utility of Oncotype in this case is probably low.  We reviewed that the likelihood of distant recurrence is relatively low.  This probably decreases by about 3% absolutely with adjuvant hormonal blockade for 5 years or more.  We reviewed potential toxicities including but not limited to hot flashes, joint stiffness, increased risk for osteoporosis.  Note is made of DEXA scan  showing osteopenia.  Repeat DEXA scan is requested we reviewed strategies to minimize risk for osteoporosis.  We reviewed that if distant recurrence occurs this can be treated although likely would not be curable.  After  considering all of the above the patient wishes to think about this and let us know if she wishes to proceed with further therapy.  She is planning to proceed with radiation therapy consultation in a few days as scheduled.  At this time.    Orders:  •  Ambulatory Referral to Hematology / Oncology          Encounter provider Tian Galo,     The patient was identified by name and date of birth. Gardenia Hunter was informed that this is a telemedicine visit and that the visit is being conducted through the Epic Embedded platform. She agrees to proceed..  My office door was closed. No one else was in the room.  She acknowledged consent and understanding of privacy and security of the video platform. The patient has agreed to participate and understands they can discontinue the visit at any time.    Patient is aware this is a billable service.     History of Present Illness{?Quick Links Encounters * My Last Note * Last Note in Specialty * Snapshot * Since Last Visit * History :82969}    HPI November 8, 2024 patient underwent right breast lumpectomy, axillary sentinel node sampling.  Pathology indicates right breast infiltrating ductal carcinoma, grade 1.  11 mm.  4/4 nodes negative = T1c, N0.  Previous biopsy showed invasive mammary carcinoma, ER 95, DC 15, HER2 0.    Review of Systems   Constitutional:  Negative for appetite change, diaphoresis, fatigue and fever.   HENT:  Negative for sinus pain.    Eyes:  Negative for discharge.   Respiratory:  Negative for cough and shortness of breath.    Cardiovascular:  Negative for chest pain.   Gastrointestinal:  Negative for abdominal pain, constipation and diarrhea.   Endocrine: Negative for cold intolerance.   Genitourinary:  Negative for difficulty urinating and hematuria.   Musculoskeletal:  Negative for joint swelling.   Skin:  Negative for rash.   Allergic/Immunologic: Negative for environmental allergies.   Neurological:  Negative for dizziness and  headaches.   Hematological:  Negative for adenopathy.   Psychiatric/Behavioral:  Negative for agitation.        Objective{?Quick Links Trend Vitals * Enter New Vitals * Results Review * Timeline (Adult) * Labs * Imaging * Cardiology * Procedures * Lung Cancer Screening * Surgical eConsent :[id number]}  There were no vitals taken for this visit.    Physical Exam  Constitutional:       General: She is not in acute distress.     Appearance: She is well-developed.   HENT:      Head: Atraumatic.      Nose: No congestion.   Eyes:      Conjunctiva/sclera: Conjunctivae normal.   Pulmonary:      Effort: Pulmonary effort is normal.   Abdominal:      General: There is no distension.   Musculoskeletal:         General: No deformity.      Cervical back: Normal range of motion.   Skin:     Findings: No rash.   Neurological:      Mental Status: She is alert and oriented to person, place, and time.         Visit Time  Total Visit Duration: ***

## 2024-11-22 NOTE — LETTER
2024     Laly Qiu MD  [address] [facility]  [address]  [place] [address]    Patient: Gardenia Hunter   YOB: 1960   Date of Visit: 2024       Dear Dr. Qiu:    Thank you for referring Gardenia Hunter to me for evaluation. Below are my notes for this consultation.    If you have questions, please do not hesitate to call me. I look forward to following your patient along with you.         Sincerely,        Tian Galo,         CC: DO Tian Delgado DO  2024  2:49 PM  Sign when Signing Visit  Virtual Regular Visit  Name: Gardenia Hunter      : 1960      MRN: [id number]  Encounter Provider: Tian Galo DO  Encounter Date: 2024   Encounter department: [facility] HEMATOLOGY ONCOLOGY SPECIALISTS [place]      Verification of patient location:  Patient is located at Home in the following state in which I hold an active license [place] :  Assessment & Plan  Invasive ductal carcinoma of breast, female, right (HCC)  In summary, this is a 64-year-old female with history of right breast abnormality by mammogram.  Pathology indicates T1c, N0 Infiltrating ductal carcinoma.  ER 95, LA 15, HER2 0.  BRCA1/2 negative.  We reviewed that the risk of recurrence is relatively low.  At 11 mm tumor falls at the low end of the T1c range.  Utility of Oncotype in this case is probably low.  We reviewed that the likelihood of distant recurrence is relatively low.  This probably decreases by about 3% absolutely with adjuvant hormonal blockade for 5 years or more.  We reviewed potential toxicities including but not limited to hot flashes, joint stiffness, increased risk for osteoporosis.  Note is made of DEXA scan  showing osteopenia.  Repeat DEXA scan is requested we reviewed strategies to minimize risk for osteoporosis.  We reviewed that if distant recurrence occurs this can be treated although likely would not be curable.  After  considering all of the above the patient wishes to think about this and let us know if she wishes to proceed with further therapy.  She is planning to proceed with radiation therapy consultation in a few days as scheduled.  At this time.    Orders:  •  Ambulatory Referral to Hematology / Oncology          Encounter provider Tian Galo DO    The patient was identified by name and date of birth. Gardenia Hunter was informed that this is a telemedicine visit and that the visit is being conducted through the Epic Embedded platform. She agrees to proceed..  My office door was closed. No one else was in the room.  She acknowledged consent and understanding of privacy and security of the video platform. The patient has agreed to participate and understands they can discontinue the visit at any time.    Patient is aware this is a billable service.     History of Present Illness    HPI November 8, 2024 patient underwent right breast lumpectomy, axillary sentinel node sampling.  Pathology indicates right breast infiltrating ductal carcinoma, grade 1.  11 mm.  4/4 nodes negative = T1c, N0.  Previous biopsy showed invasive mammary carcinoma, ER 95, ID 15, HER2 0.    Review of Systems   Constitutional:  Negative for appetite change, diaphoresis, fatigue and fever.   HENT:  Negative for sinus pain.    Eyes:  Negative for discharge.   Respiratory:  Negative for cough and shortness of breath.    Cardiovascular:  Negative for chest pain.   Gastrointestinal:  Negative for abdominal pain, constipation and diarrhea.   Endocrine: Negative for cold intolerance.   Genitourinary:  Negative for difficulty urinating and hematuria.   Musculoskeletal:  Negative for joint swelling.   Skin:  Negative for rash.   Allergic/Immunologic: Negative for environmental allergies.   Neurological:  Negative for dizziness and headaches.   Hematological:  Negative for adenopathy.   Psychiatric/Behavioral:  Negative for agitation.         Objective  There were no vitals taken for this visit.    Physical Exam  Constitutional:       General: She is not in acute distress.     Appearance: She is well-developed.   HENT:      Head: Atraumatic.      Nose: No congestion.   Eyes:      Conjunctiva/sclera: Conjunctivae normal.   Pulmonary:      Effort: Pulmonary effort is normal.   Abdominal:      General: There is no distension.   Musculoskeletal:         General: No deformity.      Cervical back: Normal range of motion.   Skin:     Findings: No rash.   Neurological:      Mental Status: She is alert and oriented to person, place, and time.         Visit Time  Total Visit Duration: 30 min.

## 2024-11-24 NOTE — PROGRESS NOTES
Consultation Visit   Name: Gardenia Hunter      : 1960      MRN: [id number]  Encounter Provider: Curtis Campbell MD  Encounter Date: 2024   Encounter department: [facility] RADIATION ONCOLOGY       Cancer Staging   Invasive ductal carcinoma of breast, female, right (HCC)  Staging form: Breast, AJCC 8th Edition  - Clinical stage from 2024: Stage IA (cT1b, cN0, cM0, G2, ER+, MS+, HER2-) - Signed by Laly Qiu MD on 10/15/2024  :  Assessment & Plan  Invasive ductal carcinoma of breast, female, right (HCC)  Gardenia Hunter is a 64 y.o. 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 Counseled regarding adjuvant endocrine therapy, patient still deciding. She had a low-risk Mammaprint result.    I reviewed that adjuvant breast irradiation is standard of care following breast conservation surgery in the treatment of early stage breast cancer. Adjuvant radiation reduces her risk of local recurrence and breast cancer specific mortality.      We discussed the moderately hypofractionated whole breast irradiation (3 weeks) without a boost. She would not be suitable for APBI on the basis of LVI.    She is eligible for NRG  which is a national cooperative group clinical trial studying omission of RT in patients age 50-69 with low risk Oncotype (or Mammaprint) results. Patients on trial are randomized to radiation or omission of radiation. I discussed this today she declined enrollment.     The indications, risks, benefits, and alternatives of radiation therapy were explained to the patient. Side effects include, but are not limited to, dermatitis, erythema, hyperpigmentation,  swelling, fatigue, pneumonitis, lymphedema, cardiotoxicity, fibrosis, suboptimal cosmesis, telangiectasias, and a very low risk of secondary malignancy.  We also discussed that, even with appropriate therapy, there is still a risk of recurrence.    The patient agreed to proceed with radiation therapy, and informed consent was signed. CT simulation to be scheduled at [facility].   Orders:    Ambulatory Referral to Radiation Oncology    History of Present Illness   Chief Complaint   Patient presents with    Consult     Pertinent Medical History   Patient With newly diagnosed right breast invasive mammary carcinoma, mucinous adenocarcinoma,Stage IA,ER/DE+,HER2-. She originally presented with an abnormal mammogram. She presents to discuss Radiation. Referred by . Her genetic testing was negative. Her Mamma print displayed low risk,Luminal A.     Her family history of cancer includes: Maternal Grandmother with throat cancer, maternal and paternal Aunt with breast cancer.   Her past medical history includes Anxiety, GERD, and hypertension. She has a past history of smoking.   Prior right breast biopsy completed 12/27/22 with benign results    9/4/24 Mammogram bilateral   FINDINGS:   RIGHT  1) MASS [A]: There is a 10 mm x 8 mm round mass seen in the right breast at 2 o'clock, 3 cm from the nipple.  On mammography this nodule is increased from the original imaging in December 2022.  I suspect its sonographic correlate previously reported does not match, possibly more likely matching a finding seen on March 21, 2023 in the 1 o'clock position.  The prior biopsy clip though adjacent on the cc view is separate and caudal to the nodule by approximately 1.5 cm.  Recommend reassess with ultrasound and consider repeat ultrasound-guided core biopsy.     Left  There are no suspicious masses, grouped microcalcifications or areas of unexplained architectural distortion. The skin and nipple areolar complex are unremarkable.     IMPRESSION:  Additional imaging required enlarging mammographic nodule.  Prior biopsy clip is not concordant with this finding as discussed above.  Recommend repeat ultrasound correlation and ultrasound-guided core biopsy.  A breast health care nurse from our facility will be contacting the patient regarding the need for additional imaging.  ASSESSMENT/BI-RADS CATEGORY:  Left: 1 - Negative  Right: 0 - Incomplete: Needs Additional Imaging Evaluation  Overall: 0 - Incomplete: Needs Additional Imaging Evaluation  RECOMMENDATION:       - Ultrasound at the current time for the right breast.       - Routine screening mammogram in 1 year for the left breast.    9/17/24 US breast Right   IMPRESSION:   Right breast 2:00 mass is suspicious.  Ultrasound-guided core needle biopsy is recommended.  ASSESSMENT/BI-RADS CATEGORY:  Right: 4 - Suspicious  Overall: 4 - Suspicious  RECOMMENDATION:       - Ultrasound-guided breast biopsy for the right breast.    9/17/24 Tissue Exam  A. Right breast, 2:00 3cmfn, 3 passes, 12g marquee, ultrasound-guided core needle biopsy:  -   Invasive mammary carcinoma with features of mucinous carcinoma, histologic grade 2, 9 mm in greatest extent, involving three fragmented cores.    -   Breast prognostic markers (ER, NH, HER2) on A1 are pending.    9/17/24  Mammaprint results: Low Risk Luminal A.     10/10/24 Genetic testing negative.    11/8/24 Tissue Exam   INVASIVE CARCINOMA OF THE BREAST: Resection   8th Edition - Protocol posted: 6/19/2024INVASIVE CARCINOMA OF THE BREAST: RESECTION - All Specimens  SPECIMEN   Procedure  Excision (less than total mastectomy)   Specimen Laterality  Right   TUMOR   Tumor Site  Clock position     2 o'clock   Tumor Site  Distance from nipple (Centimeters): 3 cm   Histologic Type  Invasive carcinoma of no special type (ductal)   Histologic Type Comment  with prominent mucinous features   Histologic Grade ([place] Histologic Score)     Glandular (Acinar) / Tubular  Differentiation  Score 2   Nuclear Pleomorphism  Score 2   Mitotic Rate  Score 1   Overall Grade  Grade 1 (scores of 3, 4 or 5)   Tumor Size  Greatest dimension of largest invasive focus (Millimeters): 11 mm   Ductal Carcinoma In Situ (DCIS)  Present     Negative for extensive intraductal component (EIC)   Architectural Patterns  Cribriform   Nuclear Grade  Grade II (intermediate)   Necrosis  Not identified   Lobular Carcinoma In Situ (LCIS)  Present   Lymphatic and / or Vascular Invasion  Present     Focal   Microcalcifications  Present in invasive carcinoma     Present in non-neoplastic tissue   Treatment Effect in the Breast  No known presurgical therapy   MARGINS   Margin Status for Invasive Carcinoma  All margins negative for invasive carcinoma   Distance from Invasive Carcinoma to Closest Margin  Greater than: 2 mm   Margin Status for DCIS  All margins negative for DCIS   Distance from DCIS to Closest Margin  Greater than: 2 mm   REGIONAL LYMPH NODES   Regional Lymph Node Status  All regional lymph nodes negative for tumor   Total Number of Lymph Nodes Examined (sentinel and non-sentinel)  4   Number of [place] Nodes Examined  4   pTNM CLASSIFICATION (AJCC 8th Edition)   Reporting of pT, pN, and (when applicable) pM categories is based on information available to the pathologist at the time the report is issued. As per the AJCC (Chapter 1, 8th Ed.) it is the managing physician's responsibility to establish the final pathologic stage based upon all pertinent information, including but potentially not limited to this pathology report.   pT Category  pT1c   pN Category  pN0   N Suffix  (sn)   SPECIAL STUDIES        Estrogen Receptor (ER) Status  Positive (greater than 10% of cells demonstrate nuclear positivity)        Progesterone Receptor (PgR) Status  Positive        HER2 (by immunohistochemistry)  Negative (Score 0)     11/22/24   patient wishes to think about this and let us know if she wishes to  proceed with further therapy     Today the patient reports feeling well overall. She is recovering from surgery and has some soreness.     Oncology History   Oncology History   Invasive ductal carcinoma of breast, female, right (HCC)   2024 -  Cancer Staged    Staging form: Breast, AJCC 8th Edition  - Clinical stage from 2024: Stage IA (cT1b, cN0, cM0, G2, ER+, MA+, HER2-) - Signed by Laly Shen MD on 10/15/2024  Histopathologic type: Mucinous adenocarcinoma  Stage prefix: Initial diagnosis  Method of lymph node assessment: Clinical  Multigene prognostic tests performed: MammaPrint  Histologic grading system: 3 grade system  MammaPrint recurrence risk level: Low risk       10/15/2024 Initial Diagnosis    Invasive ductal carcinoma of breast, female, right (HCC)        Review of Systems Refer to nursing note.    Past Medical History   Past Medical History:   Diagnosis Date    Anxiety     Cancer (HCC)     Colon polyp     Depression     GERD (gastroesophageal reflux disease)     Hypertension      Past Surgical History:   Procedure Laterality Date     SECTION      CHOLECYSTECTOMY      COLONOSCOPY      MA MASTECTOMY PARTIAL Right 2024    Procedure: LUMPECTOMY BREAST WITH DAVIDE  LOCALIZATION, BIOPSY LYMPH NODE SENTINEL (INJECT AT 0900 BY DR SHEN IN THE OR);  Surgeon: Laly Shen MD;  Location: [facility];  Service: General    US BREAST CLIP NEEDLE LOC RIGHT Right 10/29/2024    US GUIDED BREAST BIOPSY RIGHT COMPLETE Right 2022    US GUIDED BREAST BIOPSY RIGHT COMPLETE Right 2024     Family History   Problem Relation Age of Onset    Hypertension Mother     Transient ischemic attack Mother     Throat cancer Maternal Grandmother     No Known Problems Father     No Known Problems Sister     No Known Problems Daughter     Breast cancer Maternal Aunt     No Known Problems Paternal Aunt     No Known Problems Paternal Aunt     Cancer Paternal Aunt     No Known Problems Paternal  "Aunt     Breast cancer Paternal Aunt       reports that she quit smoking about 6 years ago. Her smoking use included cigarettes. She has never used smokeless tobacco. She reports that she does not currently use alcohol. She reports that she does not use drugs.  Current Outpatient Medications on File Prior to Visit   Medication Sig Dispense Refill    sulfamethoxazole-trimethoprim (BACTRIM DS) 800-160 mg per tablet  (Patient not taking: Reported on 11/25/2024)      valACYclovir (VALTREX) 500 mg tablet Take 500 mg by mouth (Patient not taking: Reported on 11/25/2024)       No current facility-administered medications on file prior to visit.   No Known Allergies        Objective   /84 (BP Location: Left arm)   Pulse 79   Temp (!) 97.1 °F (36.2 °C) (Temporal)   Resp 17   Ht 5' 3\" (1.6 m)   Wt 87.6 kg (193 lb 2 oz)   SpO2 99%   BMI 34.21 kg/m²     Pain Screening:  Pain Score: 0-No pain  ECOG    Physical Exam   General Appearance:  Alert, cooperative, no distress, appears stated age  Cardiovascular:  Extremities warm and well perfused  Lungs: Respirations unlabored, no cyanosis, able to speak in complete sentences without dyspnea.  Breast: Deferred to time of CT simulation  Skin: No generalized rash or dermatitis  Neurologic: ANOx3, speech and cognition intact.    Radiology Results: I have reviewed radiology images/reports described above.   Pathology: I have reviewed pathology reports described above.    Administrative Statements   I have spent a total time of 56 minutes in caring for this patient on the day of the visit/encounter including Diagnostic results, Risks and benefits of tx options, Patient and family education, Risk factor reductions, Impressions, Counseling / Coordination of care, Documenting in the medical record, Reviewing / ordering tests, medicine, procedures  , and Communicating with other healthcare professionals .     "

## 2024-11-24 NOTE — ASSESSMENT & PLAN NOTE
Gardenia Hunter is a 64 y.o. 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 Counseled regarding adjuvant endocrine therapy, patient still deciding. She had a low-risk Mammaprint result.    I reviewed that adjuvant breast irradiation is standard of care following breast conservation surgery in the treatment of early stage breast cancer. Adjuvant radiation reduces her risk of local recurrence and breast cancer specific mortality.      We discussed the moderately hypofractionated whole breast irradiation (3 weeks) without a boost. She would not be suitable for APBI on the basis of LVI.    She is eligible for NRG  which is a national cooperative group clinical trial studying omission of RT in patients age 50-69 with low risk Oncotype (or Mammaprint) results. Patients on trial are randomized to radiation or omission of radiation. I discussed this today she declined enrollment.     The indications, risks, benefits, and alternatives of radiation therapy were explained to the patient. Side effects include, but are not limited to, dermatitis, erythema, hyperpigmentation, swelling, fatigue, pneumonitis, lymphedema, cardiotoxicity, fibrosis, suboptimal cosmesis, telangiectasias, and a very low risk of secondary malignancy.  We also discussed that, even with appropriate therapy, there is still a risk of recurrence.    The patient agreed to proceed with radiation therapy, and informed consent was signed. CT simulation to be scheduled at [place].   Orders:    Ambulatory Referral to Radiation Oncology

## 2024-11-25 ENCOUNTER — CONSULT (OUTPATIENT)
Dept: RADIATION ONCOLOGY | Facility: CLINIC | Age: 64
End: 2024-11-25
Payer: COMMERCIAL

## 2024-11-25 ENCOUNTER — TELEPHONE (OUTPATIENT)
Age: 64
End: 2024-11-25

## 2024-11-25 ENCOUNTER — PATIENT OUTREACH (OUTPATIENT)
Dept: HEMATOLOGY ONCOLOGY | Facility: CLINIC | Age: 64
End: 2024-11-25

## 2024-11-25 VITALS
TEMPERATURE: 97.1 F | BODY MASS INDEX: 34.22 KG/M2 | WEIGHT: 193.12 LBS | DIASTOLIC BLOOD PRESSURE: 84 MMHG | HEART RATE: 79 BPM | HEIGHT: 63 IN | OXYGEN SATURATION: 99 % | RESPIRATION RATE: 17 BRPM | SYSTOLIC BLOOD PRESSURE: 170 MMHG

## 2024-11-25 DIAGNOSIS — C50.911 INVASIVE DUCTAL CARCINOMA OF BREAST, FEMALE, RIGHT (HCC): Primary | ICD-10-CM

## 2024-11-25 PROCEDURE — 99245 OFF/OP CONSLTJ NEW/EST HI 55: CPT | Performed by: INTERNAL MEDICINE

## 2024-11-25 PROCEDURE — G0463 HOSPITAL OUTPT CLINIC VISIT: HCPCS | Performed by: RADIOLOGY

## 2024-11-25 PROCEDURE — 99211 OFF/OP EST MAY X REQ PHY/QHP: CPT | Performed by: RADIOLOGY

## 2024-11-25 NOTE — PROGRESS NOTES
Gardenia Grantztaras 1960 is a 64 y.o. 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 She originally presented with an abnormal mammogram. She presents to discuss Radiation. Referred by . Her genetic testing was negative. Her Mamma print displayed low risk,Luminal A.     Her family history of cancer includes: Maternal Grandmother with throat cancer, maternal and paternal Aunt with breast cancer.   Her past medical history includes Anxiety, GERD, and hypertension. She has a past history of smoking.   Prior right breast biopsy completed 12/27/22 with benign results    9/4/24 Mammogram bilateral   FINDINGS:   RIGHT  1) MASS [A]: There is a 10 mm x 8 mm round mass seen in the right breast at 2 o'clock, 3 cm from the nipple.  On mammography this nodule is increased from the original imaging in December 2022.  I suspect its sonographic correlate previously reported does not match, possibly more likely matching a finding seen on March 21, 2023 in the 1 o'clock position.  The prior biopsy clip though adjacent on the cc view is separate and caudal to the nodule by approximately 1.5 cm.  Recommend reassess with ultrasound and consider repeat ultrasound-guided core biopsy.     Left  There are no suspicious masses, grouped microcalcifications or areas of unexplained architectural distortion. The skin and nipple areolar complex are unremarkable.    IMPRESSION:  Additional imaging required enlarging mammographic nodule.  Prior biopsy clip is not concordant with this finding as discussed above.  Recommend repeat ultrasound correlation and ultrasound-guided core biopsy.  A breast health care nurse from our facility will be contacting the patient regarding the need for additional imaging.  ASSESSMENT/BI-RADS CATEGORY:  Left: 1 - Negative  Right: 0 - Incomplete: Needs Additional Imaging Evaluation  Overall: 0 - Incomplete: Needs Additional Imaging  Evaluation  RECOMMENDATION:       - Ultrasound at the current time for the right breast.       - Routine screening mammogram in 1 year for the left breast.    9/17/24 US breast Right   IMPRESSION:   Right breast 2:00 mass is suspicious.  Ultrasound-guided core needle biopsy is recommended.  ASSESSMENT/BI-RADS CATEGORY:  Right: 4 - Suspicious  Overall: 4 - Suspicious  RECOMMENDATION:       - Ultrasound-guided breast biopsy for the right breast.    9/17/24 Tissue Exam  A. Right breast, 2:00 3cmfn, 3 passes, 12g marquee, ultrasound-guided core needle biopsy:  -   Invasive mammary carcinoma with features of mucinous carcinoma, histologic grade 2, 9 mm in greatest extent, involving three fragmented cores.    -   Breast prognostic markers (ER, [place], HER2) on A1 are pending.    9/17/24  Mammaprint results: Low Risk Luminal A.     10/10/24 Genetic testing negative.    11/8/24 Tissue Exam   INVASIVE CARCINOMA OF THE BREAST: Resection   8th Edition - Protocol posted: 6/19/2024INVASIVE CARCINOMA OF THE BREAST: RESECTION - All Specimens  SPECIMEN   Procedure  Excision (less than total mastectomy)   Specimen Laterality  Right   TUMOR   Tumor Site  Clock position     2 o'clock   Tumor Site  Distance from nipple (Centimeters): 3 cm   Histologic Type  Invasive carcinoma of no special type (ductal)   Histologic Type Comment  with prominent mucinous features   Histologic Grade ([place] Histologic Score)     Glandular (Acinar) / Tubular Differentiation  Score 2   Nuclear Pleomorphism  Score 2   Mitotic Rate  Score 1   Overall Grade  Grade 1 (scores of 3, 4 or 5)   Tumor Size  Greatest dimension of largest invasive focus (Millimeters): 11 mm   Ductal Carcinoma In Situ (DCIS)  Present     Negative for extensive intraductal component (EIC)   Architectural Patterns  Cribriform   Nuclear Grade  Grade II (intermediate)   Necrosis  Not identified   Lobular Carcinoma In Situ (LCIS)  Present   Lymphatic and / or Vascular Invasion  Present      Focal   Microcalcifications  Present in invasive carcinoma     Present in non-neoplastic tissue   Treatment Effect in the Breast  No known presurgical therapy   MARGINS   Margin Status for Invasive Carcinoma  All margins negative for invasive carcinoma   Distance from Invasive Carcinoma to Closest Margin  Greater than: 2 mm   Margin Status for DCIS  All margins negative for DCIS   Distance from DCIS to Closest Margin  Greater than: 2 mm   REGIONAL LYMPH NODES   Regional Lymph Node Status  All regional lymph nodes negative for tumor   Total Number of Lymph Nodes Examined (sentinel and non-sentinel)  4   Number of [place] Nodes Examined  4   pTNM CLASSIFICATION (AJCC 8th Edition)   Reporting of pT, pN, and (when applicable) pM categories is based on information available to the pathologist at the time the report is issued. As per the AJCC (Chapter 1, 8th Ed.) it is the managing physician's responsibility to establish the final pathologic stage based upon all pertinent information, including but potentially not limited to this pathology report.   pT Category  pT1c   pN Category  pN0   N Suffix  (sn)   SPECIAL STUDIES        Estrogen Receptor (ER) Status  Positive (greater than 10% of cells demonstrate nuclear positivity)        Progesterone Receptor (PgR) Status  Positive        HER2 (by immunohistochemistry)  Negative (Score 0)       24   Considering AI.  Will let office know decision.     Upcomin25  Dr. Qiu          Oncology History Overview Note   With newly diagnosed right breast invasive mammary carcinoma, mucinous adenocarcinoma,Stage IA,ER/ID+,HER2-. She originally presented with an abnormal mammogram. She presents to discuss Radiation. Referred by . Her genetic testing was negative. Her Mamma print displayed low risk,Luminal A.     Her family history of cancer includes: Maternal Grandmother with throat cancer, maternal and paternal Aunt with breast cancer.   Her past medical  history includes Anxiety, GERD, and hypertension. She has a past history of smoking.   Prior right breast biopsy completed 12/27/22 with benign results    9/4/24 Mammogram bilateral   FINDINGS:   RIGHT  1) MASS [A]: There is a 10 mm x 8 mm round mass seen in the right breast at 2 o'clock, 3 cm from the nipple.  On mammography this nodule is increased from the original imaging in December 2022.  I suspect its sonographic correlate previously reported does not match, possibly more likely matching a finding seen on March 21, 2023 in the 1 o'clock position.  The prior biopsy clip though adjacent on the cc view is separate and caudal to the nodule by approximately 1.5 cm.  Recommend reassess with ultrasound and consider repeat ultrasound-guided core biopsy.     Left  There are no suspicious masses, grouped microcalcifications or areas of unexplained architectural distortion. The skin and nipple areolar complex are unremarkable.    IMPRESSION:  Additional imaging required enlarging mammographic nodule.  Prior biopsy clip is not concordant with this finding as discussed above.  Recommend repeat ultrasound correlation and ultrasound-guided core biopsy.  A breast health care nurse from our facility will be contacting the patient regarding the need for additional imaging.  ASSESSMENT/BI-RADS CATEGORY:  Left: 1 - Negative  Right: 0 - Incomplete: Needs Additional Imaging Evaluation  Overall: 0 - Incomplete: Needs Additional Imaging Evaluation  RECOMMENDATION:       - Ultrasound at the current time for the right breast.       - Routine screening mammogram in 1 year for the left breast.    9/17/24 US breast Right   IMPRESSION:   Right breast 2:00 mass is suspicious.  Ultrasound-guided core needle biopsy is recommended.  ASSESSMENT/BI-RADS CATEGORY:  Right: 4 - Suspicious  Overall: 4 - Suspicious  RECOMMENDATION:       - Ultrasound-guided breast biopsy for the right breast.    9/17/24 Tissue Exam  A. Right breast, 2:00 3cmfn, 3  passes, 12g marquee, ultrasound-guided core needle biopsy:  -   Invasive mammary carcinoma with features of mucinous carcinoma, histologic grade 2, 9 mm in greatest extent, involving three fragmented cores.    -   Breast prognostic markers (ER, [place], HER2) on A1 are pending.    9/17/24  Mammaprint results: Low Risk Luminal A.     10/10/24 Genetic testing negative.    11/8/24 Tissue Exam   INVASIVE CARCINOMA OF THE BREAST: Resection   8th Edition - Protocol posted: 6/19/2024INVASIVE CARCINOMA OF THE BREAST: RESECTION - All Specimens  SPECIMEN   Procedure  Excision (less than total mastectomy)   Specimen Laterality  Right   TUMOR   Tumor Site  Clock position     2 o'clock   Tumor Site  Distance from nipple (Centimeters): 3 cm   Histologic Type  Invasive carcinoma of no special type (ductal)   Histologic Type Comment  with prominent mucinous features   Histologic Grade ([place] Histologic Score)     Glandular (Acinar) / Tubular Differentiation  Score 2   Nuclear Pleomorphism  Score 2   Mitotic Rate  Score 1   Overall Grade  Grade 1 (scores of 3, 4 or 5)   Tumor Size  Greatest dimension of largest invasive focus (Millimeters): 11 mm   Ductal Carcinoma In Situ (DCIS)  Present     Negative for extensive intraductal component (EIC)   Architectural Patterns  Cribriform   Nuclear Grade  Grade II (intermediate)   Necrosis  Not identified   Lobular Carcinoma In Situ (LCIS)  Present   Lymphatic and / or Vascular Invasion  Present     Focal   Microcalcifications  Present in invasive carcinoma     Present in non-neoplastic tissue   Treatment Effect in the Breast  No known presurgical therapy   MARGINS   Margin Status for Invasive Carcinoma  All margins negative for invasive carcinoma   Distance from Invasive Carcinoma to Closest Margin  Greater than: 2 mm   Margin Status for DCIS  All margins negative for DCIS   Distance from DCIS to Closest Margin  Greater than: 2 mm   REGIONAL LYMPH NODES   Regional Lymph Node Status  All  regional lymph nodes negative for tumor   Total Number of Lymph Nodes Examined (sentinel and non-sentinel)  4   Number of [place] Nodes Examined  4   pTNM CLASSIFICATION (AJCC 8th Edition)   Reporting of pT, pN, and (when applicable) pM categories is based on information available to the pathologist at the time the report is issued. As per the AJCC (Chapter 1, 8th Ed.) it is the managing physician's responsibility to establish the final pathologic stage based upon all pertinent information, including but potentially not limited to this pathology report.   pT Category  pT1c   pN Category  pN0   N Suffix  (sn)   SPECIAL STUDIES        Estrogen Receptor (ER) Status  Positive (greater than 10% of cells demonstrate nuclear positivity)        Progesterone Receptor (PgR) Status  Positive        HER2 (by immunohistochemistry)  Negative (Score 0)       24   Considering AI.  Will let office know decision    Upcomin25  Dr. Qiu           Invasive ductal carcinoma of breast, female, right (HCC)   2024 -  Cancer Staged    Staging form: Breast, AJCC 8th Edition  - Clinical stage from 2024: Stage IA (cT1b, cN0, cM0, G2, ER+, [place]+, HER2-) - Signed by Laly Qiu MD on 10/15/2024  Histopathologic type: Mucinous adenocarcinoma  Stage prefix: Initial diagnosis  Method of lymph node assessment: Clinical  Multigene prognostic tests performed: MammaPrint  Histologic grading system: 3 grade system  MammaPrint recurrence risk level: Low risk       10/15/2024 Initial Diagnosis    Invasive ductal carcinoma of breast, female, right (HCC)         Review of Systems:  Review of Systems   Constitutional:  Negative for activity change, appetite change and fever.   HENT: Negative.     Eyes: Negative.         Reports wears contacts   Respiratory: Negative.     Cardiovascular: Negative.    Gastrointestinal: Negative.    Endocrine: Negative.    Genitourinary: Negative.    Musculoskeletal: Negative.    Skin:  Negative.         Reports rash around incision site on R breast.  Reports soreness, and tenderness, and Red On R breast,  Reports uses Eucerin cream  Denies drainage or open areas on R breast.    Allergic/Immunologic: Negative.    Neurological:         On visual assessment has good range of motion bilaterally in arms.    Hematological: Negative.    Psychiatric/Behavioral: Negative.         Clinical Trial: no    OB/GYN History:  The patient underwent menarche at 13 years  Menopause Status Post  No LMP recorded. Patient is postmenopausal.  Menopause at 45} years.  Menopause Reason  Natural   Hormone replacement therapy: no.  Years used none    2   Para 2   Age at first delivery being 22 years.   Nursing: no.   Birth control pills: yes.  Years used 5 years     Pregnancy test needed:  no    ONCOTYPE/MAMMOPRINT results 24 completed         Prior Radiation none     Teaching NCL booklet given,SIM, Side Effects    MST completed     Implantable Devices (Port, Pacemaker, pain stimulator) none          [unfilled]  Health Maintenance   Topic Date Due    Pneumococcal Vaccine: Pediatrics (0 to 5 Years) and At-Risk Patients (6 to 64 Years) (1 of 2 - PCV) Never done    Depression Screening  Never done    HIV Screening  Never done    BMI: Followup Plan  Never done    Annual Physical  Never done    Zoster Vaccine (1 of 2) Never done    Cervical Cancer Screening  Never done    Colorectal Cancer Screening  Never done    RSV Vaccine Age 60+ Years (1 - Risk 60-74 years 1-dose series) Never done    COVID-19 Vaccine (3 - Moderna risk series) 2022    Influenza Vaccine (1) Never done    Breast Cancer Screening: Mammogram  2025    BMI: Adult  2025    DTaP,Tdap,and Td Vaccines (2 - Td or Tdap) 2032    Hepatitis C Screening  Completed    RSV Vaccine age 0-20 Months  Aged Out    HIB Vaccine  Aged Out    IPV Vaccine  Aged Out    Hepatitis A Vaccine  Aged Out    Meningococcal ACWY Vaccine  Aged Out     HPV Vaccine  Aged Out     Past Medical History:   Diagnosis Date    Anxiety     Cancer (HCC)     Colon polyp     Depression     GERD (gastroesophageal reflux disease)     Hypertension      Past Surgical History:   Procedure Laterality Date     SECTION      CHOLECYSTECTOMY      COLONOSCOPY      AK MASTECTOMY PARTIAL Right 2024    Procedure: LUMPECTOMY BREAST WITH DAVIDE  LOCALIZATION, BIOPSY LYMPH NODE SENTINEL (INJECT AT 0900 BY DR SHEN IN THE OR);  Surgeon: Laly Shen MD;  Location: [facility];  Service: General    US BREAST CLIP NEEDLE LOC RIGHT Right 10/29/2024    US GUIDED BREAST BIOPSY RIGHT COMPLETE Right 2022    US GUIDED BREAST BIOPSY RIGHT COMPLETE Right 2024     Family History   Problem Relation Age of Onset    Hypertension Mother     Transient ischemic attack Mother     Throat cancer Maternal Grandmother     No Known Problems Father     No Known Problems Sister     No Known Problems Daughter     Breast cancer Maternal Aunt     No Known Problems Paternal Aunt     No Known Problems Paternal Aunt     Cancer Paternal Aunt     No Known Problems Paternal Aunt     Breast cancer Paternal Aunt      Social History     Tobacco Use    Smoking status: Former     Current packs/day: 0.00     Types: Cigarettes     Quit date: 2018     Years since quittin.0    Smokeless tobacco: Never    Tobacco comments:     Nicotine vape user   Vaping Use    Vaping status: Every Day    Substances: Nicotine   Substance Use Topics    Alcohol use: Not Currently    Drug use: Never        Current Outpatient Medications:     sulfamethoxazole-trimethoprim (BACTRIM DS) 800-160 mg per tablet, , Disp: , Rfl:     valACYclovir (VALTREX) 500 mg tablet, Take 500 mg by mouth, Disp: , Rfl:   No Known Allergies   There were no vitals filed for this visit.

## 2024-11-25 NOTE — PROGRESS NOTES
Called and spoke with patient- introduced my self as her patient navigator.  Mentioned to patient I was calling to make sure all is well and offer any supportive services she may need.    Patient mentioned she had her radiation oncology appointment earlier today.  Plans on using FMLA, patient mentioned soreness but no pain at this time.    Mentioned that patient should plan to hear from me periodically just to follow up with patient her to make sure everything is going well.    Patient was thankful for the call. Mentioned that I would send her my information via my chart.

## 2024-11-26 ENCOUNTER — HOSPITAL ENCOUNTER (OUTPATIENT)
Dept: BONE DENSITY | Facility: HOSPITAL | Age: 64
Discharge: HOME | End: 2024-11-26
Payer: COMMERCIAL

## 2024-11-26 VITALS — BODY MASS INDEX: 34.02 KG/M2 | HEIGHT: 63 IN | WEIGHT: 192 LBS

## 2024-11-26 DIAGNOSIS — M85.89 OSTEOPENIA OF MULTIPLE SITES: ICD-10-CM

## 2024-11-26 PROCEDURE — 77080 DXA BONE DENSITY AXIAL: CPT

## 2024-11-27 NOTE — TELEPHONE ENCOUNTER
Spoke with patient, her  is going to be dropping off forms to extend her leave to cover her treatments through the end of January.

## 2024-11-27 NOTE — TELEPHONE ENCOUNTER
Patient is requesting a call from Carie regarding her [place] Paperwork, patient stated that her Insurance will be inactive in December so this needs to be addressed right away. Thank You.

## 2024-11-30 ENCOUNTER — HOSPITAL ENCOUNTER (EMERGENCY)
Facility: HOSPITAL | Age: 64
Discharge: HOME | End: 2024-11-30
Attending: EMERGENCY MEDICINE
Payer: COMMERCIAL

## 2024-11-30 ENCOUNTER — APPOINTMENT (EMERGENCY)
Dept: RADIOLOGY | Facility: HOSPITAL | Age: 64
End: 2024-11-30
Payer: COMMERCIAL

## 2024-11-30 VITALS
OXYGEN SATURATION: 96 % | TEMPERATURE: 97.8 F | RESPIRATION RATE: 18 BRPM | WEIGHT: 190 LBS | HEART RATE: 76 BPM | SYSTOLIC BLOOD PRESSURE: 159 MMHG | BODY MASS INDEX: 33.66 KG/M2 | HEIGHT: 63 IN | DIASTOLIC BLOOD PRESSURE: 73 MMHG

## 2024-11-30 DIAGNOSIS — M23.90 INTERNAL DERANGEMENT OF KNEE: Primary | ICD-10-CM

## 2024-11-30 PROCEDURE — 99283 EMERGENCY DEPT VISIT LOW MDM: CPT

## 2024-11-30 PROCEDURE — 96372 THER/PROPH/DIAG INJ SC/IM: CPT

## 2024-11-30 PROCEDURE — 99284 EMERGENCY DEPT VISIT MOD MDM: CPT | Performed by: EMERGENCY MEDICINE

## 2024-11-30 PROCEDURE — 73564 X-RAY EXAM KNEE 4 OR MORE: CPT

## 2024-11-30 RX ORDER — KETOROLAC TROMETHAMINE 10 MG/1
10 TABLET, FILM COATED ORAL EVERY 6 HOURS PRN
Qty: 10 TABLET | Refills: 0 | Status: SHIPPED | OUTPATIENT
Start: 2024-11-30 | End: 2024-12-05

## 2024-11-30 RX ORDER — KETOROLAC TROMETHAMINE 30 MG/ML
30 INJECTION, SOLUTION INTRAMUSCULAR; INTRAVENOUS ONCE
Status: COMPLETED | OUTPATIENT
Start: 2024-11-30 | End: 2024-11-30

## 2024-11-30 RX ADMIN — KETOROLAC TROMETHAMINE 30 MG: 30 INJECTION, SOLUTION INTRAMUSCULAR at 14:38

## 2024-11-30 NOTE — ED PROVIDER NOTES
Time reflects when diagnosis was documented in both MDM as applicable and the Disposition within this note       Time User Action Codes Description Comment    11/30/2024  3:18 PM Loc Aguilar Add [M23.90] Internal derangement of knee           ED Disposition       ED Disposition   Discharge    Condition   Stable    Date/Time   Sat Nov 30, 2024  3:18 PM    Comment   Gardenia Hunter discharge to home/self care.                   Assessment & Plan       Medical Decision Making  64-year-old female presents emergency department with acute on chronic right knee pain.  The patient notes that she has been having discomfort to the anterior and lateral aspect of the right knee for some time.  The patient notes she will have flareups of this that go away.  Over the last few days though the pain has become more chronic and then radiating into the back of her knee and into the upper calf and anterior thigh.  The patient denies any swelling or discoloration.  Patient denies any new trauma or fever.  The patient denies IV drug use.  Patient does have a history of breast cancer and had a recent lumpectomy.  Patient will be going for radiation in early 2025.  Patient was concerned that maybe she had a blood clot.  Differential diagnosis is blood clot versus musculoskeletal strain versus meniscal injury or potentially a lateral collateral ligament sprain.  On evaluation, the patient does not have typical findings of potential DVT as the pain is anterior over the knee and kneecap and to the lateral anterior knee.  Patient has increasing pain with Apley's compression and distraction and also has easy reproducibility with flexion and extension at the joint.  The patient has radiation of pain to the upper calf but has no significant calf tenderness with squeezing.  Patient has no evidence of cellulitis.  X-ray looks nonacute.  Symptoms were discussed with the patient and I feel her symptoms are most consistent with a meniscal  injury due to positive pain with Apley's compression.  Patient was recommended to utilize knee immobilizer and crutches and to follow-up with orthopedics for further treatment.  Patient was given a dose of Toradol while in the emergency department and will be placed on Toradol as outpatient.  Patient to return to the ER for any new or concerning issues.  Patient discharged.    Amount and/or Complexity of Data Reviewed  Radiology: ordered.    Risk  Prescription drug management.             Medications   ketorolac (TORADOL) injection 30 mg (30 mg Intramuscular Given 24 1438)       ED Risk Strat Scores                           SBIRT 22yo+      Flowsheet Row Most Recent Value   Initial Alcohol Screen: US AUDIT-C     1. How often do you have a drink containing alcohol? 0 Filed at: 2024 1245   2. How many drinks containing alcohol do you have on a typical day you are drinking?  0 Filed at: 2024 1245   3b. FEMALE Any Age, or MALE 65+: How often do you have 4 or more drinks on one occassion? 0 Filed at: 2024 1245   Audit-C Score 0 Filed at: 2024 1245   MIKAELA: How many times in the past year have you...    Used an illegal drug or used a prescription medication for non-medical reasons? Never Filed at: 2024 1249                            History of Present Illness       Chief Complaint   Patient presents with    Knee Pain     According to the patient she has had knee pain for years but has become more swollen and painful in the last 2 days .       Past Medical History:   Diagnosis Date    Anxiety     Cancer (HCC)     Colon polyp     Depression     GERD (gastroesophageal reflux disease)     Hypertension       Past Surgical History:   Procedure Laterality Date     SECTION      CHOLECYSTECTOMY      COLONOSCOPY      [place] MASTECTOMY PARTIAL Right 2024    Procedure: LUMPECTOMY BREAST WITH DAVIDE  LOCALIZATION, BIOPSY LYMPH NODE SENTINEL (INJECT AT 0900 BY DR SHEN IN THE OR);   Surgeon: Laly Qiu MD;  Location: [facility];  Service: General    US BREAST CLIP NEEDLE LOC RIGHT Right 10/29/2024    US GUIDED BREAST BIOPSY RIGHT COMPLETE Right 2022    US GUIDED BREAST BIOPSY RIGHT COMPLETE Right 2024      Family History   Problem Relation Age of Onset    Hypertension Mother     Transient ischemic attack Mother     Throat cancer Maternal Grandmother     No Known Problems Father     No Known Problems Sister     No Known Problems Daughter     Breast cancer Maternal Aunt     No Known Problems Paternal Aunt     No Known Problems Paternal Aunt     Cancer Paternal Aunt     No Known Problems Paternal Aunt     Breast cancer Paternal Aunt       Social History     Tobacco Use    Smoking status: Former     Current packs/day: 0.00     Types: Cigarettes     Quit date: 2018     Years since quittin.0    Smokeless tobacco: Never    Tobacco comments:     Nicotine vape user   Vaping Use    Vaping status: Every Day    Substances: Nicotine   Substance Use Topics    Alcohol use: Not Currently    Drug use: Never      E-Cigarette/Vaping    E-Cigarette Use Current Every Day User       E-Cigarette/Vaping Substances    Nicotine Yes     THC No     CBD No     Flavoring No     Other No     Unknown No       I have reviewed and agree with the history as documented.     64-year-old female presents emergency department complaining of knee pain.  Patient notes that she has had chronic knee pain for some time but relates that over the last few days it has gotten worse.  Patient denies any fever chills or significant trauma.   The patient notes the pain to be primarily on the anterior lateral aspect of her right knee.  The patient notes that some of the pain radiates into her calf and anterior thigh.  Patient denies any swelling redness fever or chills.  Patient came to the ER for concerns of blood clot.        Review of Systems   Respiratory:  Negative for cough and shortness of breath.     Cardiovascular:  Negative for chest pain and palpitations.   Gastrointestinal:  Negative for abdominal pain and vomiting.   Genitourinary:  Negative for dysuria and hematuria.   Musculoskeletal:  Positive for arthralgias, gait problem, joint swelling and myalgias. Negative for back pain.   Skin:  Negative for color change and rash.   All other systems reviewed and are negative.          Objective       ED Triage Vitals   Temperature Pulse Blood Pressure Respirations SpO2 Patient Position - Orthostatic VS   11/30/24 1244 11/30/24 1244 11/30/24 1244 11/30/24 1244 11/30/24 1244 11/30/24 1244   97.8 °F (36.6 °C) 77 (!) 213/93 18 98 % Lying      Temp Source Heart Rate Source BP Location FiO2 (%) Pain Score    11/30/24 1244 11/30/24 1300 11/30/24 1244 -- 11/30/24 1244    Temporal Monitor Right arm  8      Vitals      Date and Time Temp Pulse SpO2 Resp BP Pain Score FACES Pain Rating User   11/30/24 1538 -- 76 96 % 18 159/73 -- -- [facility]   11/30/24 1438 -- -- -- -- -- 7 --    11/30/24 1300 -- 83 97 % 18 134/61 -- -- [facility]   11/30/24 1244 97.8 °F (36.6 °C) 77 98 % 18 213/93 8 -- [facility]            Physical Exam  Vitals and nursing note reviewed.   Constitutional:       General: She is not in acute distress.     Appearance: She is well-developed.   HENT:      Head: Normocephalic and atraumatic.   Eyes:      Conjunctiva/sclera: Conjunctivae normal.   Cardiovascular:      Rate and Rhythm: Normal rate and regular rhythm.      Heart sounds: No murmur heard.  Pulmonary:      Effort: Pulmonary effort is normal. No respiratory distress.      Breath sounds: Normal breath sounds.   Abdominal:      Palpations: Abdomen is soft.      Tenderness: There is no abdominal tenderness.   Musculoskeletal:         General: Tenderness present. No swelling, deformity or signs of injury.      Cervical back: Neck supple.      Right lower leg: No edema.      Left lower leg: No edema.      Comments: Examination of the patient's right knee reveals  "tenderness anteriorly to the joint line just lateral to the patella as well as the lateral joint line of the knee.  There does not appear to be any significant effusion and there is negative anterior posterior drawer.  The patient's pain is exacerbated by bending the knee and although some of her pain radiates to the calf, it is not exacerbated by squeezing the calf.  With this examination, I feel that the patient's symptoms are more \"knee and\" not a DVT.  Bilateral lower extremities are 2/4 pulses at the dorsalis pedis and posterior tibial regions.    Patient has pain with Apley's compression and Apley's distraction of the involved knee.   Skin:     General: Skin is warm and dry.      Capillary Refill: Capillary refill takes less than 2 seconds.      Findings: No erythema.   Neurological:      General: No focal deficit present.      Mental Status: She is alert and oriented to person, place, and time.   Psychiatric:         Mood and Affect: Mood normal.         Results Reviewed       None            XR knee 4+ vw right injury    (Results Pending)       Procedures    ED Medication and Procedure Management   Prior to Admission Medications   Prescriptions Last Dose Informant Patient Reported? Taking?   sulfamethoxazole-trimethoprim (BACTRIM DS) 800-160 mg per tablet  Self Yes No   Patient not taking: Reported on 11/25/2024   valACYclovir (VALTREX) 500 mg tablet  Self Yes No   Sig: Take 500 mg by mouth   Patient not taking: Reported on 11/25/2024      Facility-Administered Medications: None     Discharge Medication List as of 11/30/2024  3:26 PM        START taking these medications    Details   ketorolac (TORADOL) 10 mg tablet Take 1 tablet (10 mg total) by mouth every 6 (six) hours as needed for moderate pain for up to 10 doses, Starting Sat 11/30/2024, Normal           CONTINUE these medications which have NOT CHANGED    Details   sulfamethoxazole-trimethoprim (BACTRIM DS) 800-160 mg per tablet Historical Med    "   valACYclovir (VALTREX) 500 mg tablet Take 500 mg by mouth, Starting Wed 8/28/2024, Historical Med             ED SEPSIS DOCUMENTATION   Time reflects when diagnosis was documented in both MDM as applicable and the Disposition within this note       Time User Action Codes Description Comment    11/30/2024  3:18 PM Loc Aguilar Add [M23.90] Internal derangement of knee                  Loc Aguilar Jr., DO  11/30/24 1546

## 2024-11-30 NOTE — DISCHARGE INSTRUCTIONS
Use ice to the right knee 4-6 times a day for 10 to 15 minutes at a time.    Use crutches and knee immobilizer as discussed over the next few days.    You may try to bear weight in 2 or 3 days to see if your problems have improved with the use of Toradol.  Take this medicine 1 pill every 6 hours as needed.  Take with food.Do not take Advil, Motrin, ibuprofen, Aleve, Naprosyn, or aspirin while on Toradol.  You may take Tylenol if necessary.     We have made a referral to orthopedics for follow-up.  If you develop increasing swelling redness pain or other new or concerning issues I would like you to return to the ER for evaluation.  An MRI may be necessary of your knee to further delineate the problem which appears to be most likely a meniscal injury on the right lateral aspect of your knee.

## 2024-12-02 PROBLEM — N39.0 RECURRENT UTI: Status: ACTIVE | Noted: 2024-12-02

## 2024-12-02 PROBLEM — Z12.11 COLON CANCER SCREENING: Status: ACTIVE | Noted: 2024-12-02

## 2024-12-02 NOTE — PROGRESS NOTES
Post-Op Note - Surgical Oncology  Gardenia Hunter 64 y.o. female MRN: [id number]  Encounter: [id number]    Assessment & Plan     64F 2 weeks status post right breast lumpectomy with jalen  localization, right axillary sentinel lymph node biopsy for right breast invasive mucinous adenocarcinoma, grade 1, ER/TX+, HER2 negative, [id number]. Genetics negative. Mammaprint low risk, luminal A. Negative margins, negative nodes, recovering well, no signs of infection.     She will see medical and radiation oncology to discuss adjuvant management. We will see her back in 6 months for a surveillance visit and to set up her first post treatment mammogram.     The patient has ongoing recurrent UTIs and is back on antibiotics. I have recommended a urology consultation and she is in agreement, referral placed, will assist with scheduling.     She also mentioned chronic genital herpes with intermittent flares and associated symptomatic groin lymphadenopathy during flares. Previous excision of right groin nodes in the past related to this, currently feels some symptomatic left groin nodes.     She is overdue for colon cancer screening and is willing to update her colonoscopy, referral placed, will help her to schedule.     Subjective      Chief Complaint   Patient presents with    Breast Onc Post op      Patient is here for her po visit after having a right axillary sln bx and rt breast lumpectomy with jalen  11/8/2024. Pt states the incision is healed and denies any drainage, redness/irritation or fevers/chills. Pt states where the inc is located its mildly tender but she denies any severe pain. Pt hasn't notice any new lumps, nipple discharge, or breast pain/swelling. Pt denies any signs of right arm lymphedema     URO Referral/ C-scop discussion      Patient is currently being treated with bactrim for her UTI, Pt states the UTI's have been frequent. Pt is also here to discuss having a routine c-scope due to having  "benign polpys removed in the past. Pt denies having a personal/ family med h/o of colon cancer. Pt denies rectal pain/bleeding, diarrhea/constipation, nausea/vomiting, abd pain, trouble eating/drinking/swallowing, or heart burn.      Doing well, no signs of infection, minimal pain, will see med onc/rad onc. Path with negative margins, negative nodes, has another UTI on bactrim again. Willing to see urology.       Review of Systems   Constitutional:  Positive for activity change. Negative for fever.   Genitourinary:         Recurrent UTIs   Skin:  Positive for wound. Negative for color change.   Hematological: Negative.    All other systems reviewed and are negative.      The following portions of the patient's history were reviewed and updated as appropriate: allergies, current medications, past family history, past medical history, past social history, past surgical history, and problem list.    Objective      Blood pressure 136/70, pulse 82, temperature (!) 97.2 °F (36.2 °C), temperature source Temporal, height 5' 3\" (1.6 m), weight 87.1 kg (192 lb), SpO2 98%, not currently breastfeeding.   Physical Exam  Vitals reviewed.   Constitutional:       General: She is not in acute distress.     Appearance: She is not toxic-appearing.   HENT:      Head: Normocephalic.      Nose: No congestion.      Mouth/Throat:      Mouth: Mucous membranes are moist.   Eyes:      Pupils: Pupils are equal, round, and reactive to light.   Cardiovascular:      Rate and Rhythm: Normal rate.   Pulmonary:      Effort: Pulmonary effort is normal. No respiratory distress.   Abdominal:      Palpations: Abdomen is soft.   Musculoskeletal:         General: Normal range of motion.      Cervical back: Normal range of motion.   Skin:     General: Skin is warm.      Capillary Refill: Capillary refill takes less than 2 seconds.      Comments: Right breast and axillary incisions clean, dry, intact, no signs of infection or hematoma   Neurological:      " General: No focal deficit present.      Mental Status: She is alert. Mental status is at baseline.   Psychiatric:         Mood and Affect: Mood normal.         Signature:  Laly Qiu MD  Date: 12/2/2024 Time: 3:59 PM

## 2024-12-03 NOTE — TELEPHONE ENCOUNTER
Patient calling back and asking to speak with lana in regards to fmla paperwork     Tried getting ahold of lana but she is busy     Please call patient back and assit

## 2024-12-04 NOTE — TELEPHONE ENCOUNTER
Spoke with patient, forms completed and faxed. Confirmation fax was received, no more questions at this time.

## 2024-12-04 NOTE — TELEPHONE ENCOUNTER
Patient calling back again to speak with Carie Darnell is away     Please call patient back as her FMLA paperwork needs to be completed as it is about to end

## 2024-12-05 ENCOUNTER — APPOINTMENT (OUTPATIENT)
Dept: RADIOLOGY | Facility: CLINIC | Age: 64
End: 2024-12-05
Payer: COMMERCIAL

## 2024-12-05 ENCOUNTER — OFFICE VISIT (OUTPATIENT)
Dept: OBGYN CLINIC | Facility: CLINIC | Age: 64
End: 2024-12-05
Payer: COMMERCIAL

## 2024-12-05 VITALS
BODY MASS INDEX: 33.31 KG/M2 | HEIGHT: 63 IN | SYSTOLIC BLOOD PRESSURE: 141 MMHG | DIASTOLIC BLOOD PRESSURE: 82 MMHG | HEART RATE: 77 BPM | WEIGHT: 188 LBS

## 2024-12-05 DIAGNOSIS — Z01.89 ENCOUNTER FOR LOWER EXTREMITY COMPARISON IMAGING STUDY: ICD-10-CM

## 2024-12-05 DIAGNOSIS — M23.90 INTERNAL DERANGEMENT OF KNEE: Primary | ICD-10-CM

## 2024-12-05 DIAGNOSIS — M25.561 RIGHT KNEE PAIN, UNSPECIFIED CHRONICITY: ICD-10-CM

## 2024-12-05 PROCEDURE — 99214 OFFICE O/P EST MOD 30 MIN: CPT | Performed by: STUDENT IN AN ORGANIZED HEALTH CARE EDUCATION/TRAINING PROGRAM

## 2024-12-05 PROCEDURE — 73562 X-RAY EXAM OF KNEE 3: CPT

## 2024-12-05 PROCEDURE — 73560 X-RAY EXAM OF KNEE 1 OR 2: CPT

## 2024-12-05 RX ORDER — MELOXICAM 15 MG/1
15 TABLET ORAL DAILY
Qty: 30 TABLET | Refills: 0 | Status: SHIPPED | OUTPATIENT
Start: 2024-12-05

## 2024-12-05 NOTE — PROGRESS NOTES
Ortho Sports Medicine Knee New Patient Visit     Assesment:   64 y.o. female with right knee pain without a specific injury and x-ray showing no significant degenerative disease     Plan:  I did review the patient's history, exam, and imaging in clinic today.  I did review the radiographs that show minimal degenerative changes.  On exam, patient does not have significant findings.  I discussed starting with conservative management including an anti-inflammatory medication and physical therapy.  Patient opted to start with medication and defer on therapy at this point.  I did provide her with a prescription for meloxicam instructed not to take any other anti-inflammatories and to stop if she develops any GI symptoms.  I recommend follow-up in 6 weeks if she continues to have symptoms. The patient demonstrated understanding of the discussion and was in agreement with the plan.  All of the questions were answered.  Patient can reach out to clinic with any questions or concerns at any time.      Follow up:  Return in about 6 weeks (around 1/16/2025).        Chief Complaint   Patient presents with    Right Knee - Pain       History of Present Illness:  The patient is a 64 y.o. female seen in clinic for right knee pain.  Localizes the pain to the anterior lateral side of the knee.  She states that she bumped that area a few years ago but her symptoms resolved.  Recently, she noticed that the knee was stiff with limited range of motion as well as difficulty walking over the past weekend.  She also noted swelling, clicking, and popping.  She was seen in emergency department she had Toradol which did help with her symptoms.  Patient did note that she has breast cancer and will be starting radiation soon.  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  Patient mentioned that she surgery on the knee, likely meniscectomy 30 years ago.      Past Medical, Social and Family History:  Past Medical History:   Diagnosis Date     Anxiety     Cancer (HCC)     Colon polyp     Depression     GERD (gastroesophageal reflux disease)     Hypertension      Past Surgical History:   Procedure Laterality Date     SECTION      CHOLECYSTECTOMY      COLONOSCOPY      [place] MASTECTOMY PARTIAL Right 2024    Procedure: LUMPECTOMY BREAST WITH DAVIDE  LOCALIZATION, BIOPSY LYMPH NODE SENTINEL (INJECT AT 0900 BY DR SHEN IN THE OR);  Surgeon: Laly Shen MD;  Location: [facility];  Service: General    US BREAST CLIP NEEDLE LOC RIGHT Right 10/29/2024    US GUIDED BREAST BIOPSY RIGHT COMPLETE Right 2022    US GUIDED BREAST BIOPSY RIGHT COMPLETE Right 2024     No Known Allergies  Current Outpatient Medications on File Prior to Visit   Medication Sig Dispense Refill    sulfamethoxazole-trimethoprim (BACTRIM DS) 800-160 mg per tablet  (Patient not taking: Reported on 2024)      valACYclovir (VALTREX) 500 mg tablet Take 500 mg by mouth (Patient not taking: Reported on 2024)       No current facility-administered medications on file prior to visit.     Social History     Socioeconomic History    Marital status: /Civil Union     Spouse name: Not on file    Number of children: Not on file    Years of education: Not on file    Highest education level: Not on file   Occupational History    Not on file   Tobacco Use    Smoking status: Former     Current packs/day: 0.00     Types: Cigarettes     Quit date: 2018     Years since quittin.1    Smokeless tobacco: Never    Tobacco comments:     Nicotine vape user   Vaping Use    Vaping status: Every Day    Substances: Nicotine   Substance and Sexual Activity    Alcohol use: Not Currently    Drug use: Never    Sexual activity: Not Currently     Partners: Male   Other Topics Concern    Not on file   Social History Narrative    Not on file     Social Drivers of Health     Financial Resource Strain: Not on file   Food Insecurity: Not on file   Transportation Needs: Not on file  "  Physical Activity: Not on file   Stress: Not on file   Social Connections: Not on file   Intimate Partner Violence: Not on file   Housing Stability: Not on file         I have reviewed the past medical, surgical, social and family history, medications and allergies as documented in the EMR.    Review of systems: ROS is negative other than that noted in the HPI.  Constitutional: Negative for fatigue and fever.   HENT: Negative for sore throat.    Respiratory: Negative for shortness of breath.    Cardiovascular: Negative for chest pain.   Gastrointestinal: Negative for abdominal pain.   Endocrine: Negative for cold intolerance and heat intolerance.   Genitourinary: Negative for flank pain.   Musculoskeletal: Negative for back pain.   Skin: Negative for rash.   Allergic/Immunologic: Negative for immunocompromised state.   Neurological: Negative for dizziness.   Psychiatric/Behavioral: Negative for agitation.      Physical Exam:    Blood pressure 141/82, pulse 77, height 5' 3\" (1.6 m), weight 85.3 kg (188 lb), not currently breastfeeding.    General/Constitutional: NAD, well developed, well nourished  HENT: Normocephalic, atraumatic  CV: Intact distal pulses, regular rate  Resp: No respiratory distress or labored breathing  Neuro: Alert and Oriented x 3  Psych: Normal mood, normal affect, normal judgement, normal behavior  Skin: Warm, dry, no rashes, no erythema      Focused right knee exam:  Ambulates with a antalgic gait.    Skin is intact. No evidence of ecchymosis, erythema, gross deformity or previous surgical incisions. minimal effusion.     Palpation of the knee demonstrates no tenderness over the medial patellar facet, lateral patellar facet, tibial tubercle or patellar tendon.  There is no tenderness over the pes anserine bursa.  There is no tenderness over Gerdy's tubercle. There is no tenderness in the popliteal fossa.  There is no tenderness over the medial or lateral epicondyle.  There is no tenderness " with palpation over the posterior calf without palpable cords.    Range of motion testing demonstrates that the patient is able to achieve 0 degrees of extension and 120 degrees of flexion. There is no pain with hyperflexion or hyperextension.  There is negative patellar crepitus. The patient is able to do a straight leg raise.      Strength testing demonstrates 5/5 strength with hip flexion, 5/5 knee extension, 5/5 knee flexion, and 5/5 dorsiflexion and plantarflexion.    Provocation testing demonstrates  Mensicus- negative medial joint line tenderness, negative lateral joint line tenderness, negative Melodie's, negative flexion compression.  Collateral ligaments- negative varus laxity at full extension and in 30° of knee flexion, negative valgus laxity at full extension and in 30° of knee flexion.  Cruciate ligament- 1A Lachman examination, negative pivot shift test, 1A anterior drawer, 1A posterior drawer, negative posterior sag.  Patella- negative apprehension with lateral patellar translation (able to sublux 2 quadrant with firm endpoint), negative apprehension with medial patellar translation (able to sublux 2 quadrant with firm endpoint), negative J-sign, negative patellar grind test, negative tight lateral patellar tilt.    Range of motion testing of the hip and ankle are within normal limits.    No calf tenderness to palpation bilaterally    LE NV Exam: +2 DP/PT pulses bilaterally  Sensation intact to light touch L2-S1 bilaterally, SPN/DPN/TA motor intact        Knee Imaging    X-rays of the right knee were obtained on 12/5/2024 and reviewed with the patient. Per my independent review, the imaging shows no significant degenerative changes.

## 2024-12-18 ENCOUNTER — APPOINTMENT (OUTPATIENT)
Dept: RADIATION ONCOLOGY | Facility: CLINIC | Age: 64
End: 2024-12-18
Attending: INTERNAL MEDICINE
Payer: COMMERCIAL

## 2024-12-18 PROCEDURE — 77332 RADIATION TREATMENT AID(S): CPT | Performed by: INTERNAL MEDICINE

## 2024-12-18 PROCEDURE — 77290 THER RAD SIMULAJ FIELD CPLX: CPT | Performed by: INTERNAL MEDICINE

## 2024-12-20 PROCEDURE — 77295 3-D RADIOTHERAPY PLAN: CPT | Performed by: INTERNAL MEDICINE

## 2024-12-20 PROCEDURE — 77300 RADIATION THERAPY DOSE PLAN: CPT | Performed by: INTERNAL MEDICINE

## 2024-12-20 PROCEDURE — 77334 RADIATION TREATMENT AID(S): CPT | Performed by: INTERNAL MEDICINE

## 2024-12-23 ENCOUNTER — PATIENT OUTREACH (OUTPATIENT)
Dept: HEMATOLOGY ONCOLOGY | Facility: CLINIC | Age: 64
End: 2024-12-23

## 2024-12-23 NOTE — PROGRESS NOTES
Called and spoke with patient for follow up-checking in making sure all is well.  Patient mentions that all is well and that she currently has no questions and or concerns at this time. I did mention to patient that I'm her patient navigator and if she ever has any questions and or concerns to please feel free to reach out to me as this is my direct line. Patient was thankful for my call.

## 2024-12-26 ENCOUNTER — APPOINTMENT (OUTPATIENT)
Dept: RADIATION ONCOLOGY | Facility: CLINIC | Age: 64
End: 2024-12-26
Attending: INTERNAL MEDICINE
Payer: COMMERCIAL

## 2024-12-26 PROCEDURE — 77387 GUIDANCE FOR RADJ TX DLVR: CPT | Performed by: RADIOLOGY

## 2024-12-26 PROCEDURE — 77427 RADIATION TX MANAGEMENT X5: CPT | Performed by: INTERNAL MEDICINE

## 2024-12-26 PROCEDURE — 77412 RADIATION TX DELIVERY LVL 3: CPT | Performed by: RADIOLOGY

## 2024-12-26 PROCEDURE — 77280 THER RAD SIMULAJ FIELD SMPL: CPT | Performed by: RADIOLOGY

## 2024-12-27 PROCEDURE — 77014 CHG CT GUIDANCE RADIATION THERAPY FLDS PLACEMENT: CPT | Performed by: STUDENT IN AN ORGANIZED HEALTH CARE EDUCATION/TRAINING PROGRAM

## 2024-12-27 PROCEDURE — 77387 GUIDANCE FOR RADJ TX DLVR: CPT | Performed by: STUDENT IN AN ORGANIZED HEALTH CARE EDUCATION/TRAINING PROGRAM

## 2024-12-27 PROCEDURE — 77412 RADIATION TX DELIVERY LVL 3: CPT | Performed by: STUDENT IN AN ORGANIZED HEALTH CARE EDUCATION/TRAINING PROGRAM

## 2024-12-27 PROCEDURE — 77331 SPECIAL RADIATION DOSIMETRY: CPT | Performed by: INTERNAL MEDICINE

## 2024-12-30 PROCEDURE — 77412 RADIATION TX DELIVERY LVL 3: CPT | Performed by: INTERNAL MEDICINE

## 2024-12-30 PROCEDURE — 77014 CHG CT GUIDANCE RADIATION THERAPY FLDS PLACEMENT: CPT | Performed by: INTERNAL MEDICINE

## 2024-12-30 PROCEDURE — 77387 GUIDANCE FOR RADJ TX DLVR: CPT | Performed by: INTERNAL MEDICINE

## 2024-12-31 PROCEDURE — 77387 GUIDANCE FOR RADJ TX DLVR: CPT | Performed by: INTERNAL MEDICINE

## 2024-12-31 PROCEDURE — 77412 RADIATION TX DELIVERY LVL 3: CPT | Performed by: INTERNAL MEDICINE

## 2024-12-31 PROCEDURE — 77014 CHG CT GUIDANCE RADIATION THERAPY FLDS PLACEMENT: CPT | Performed by: INTERNAL MEDICINE

## 2025-01-01 PROBLEM — Z12.11 COLON CANCER SCREENING: Status: RESOLVED | Noted: 2024-12-02 | Resolved: 2025-01-01

## 2025-01-01 PROBLEM — N39.0 RECURRENT UTI: Status: RESOLVED | Noted: 2024-12-02 | Resolved: 2025-01-01

## 2025-01-02 ENCOUNTER — APPOINTMENT (OUTPATIENT)
Dept: RADIATION ONCOLOGY | Facility: CLINIC | Age: 65
End: 2025-01-02
Attending: INTERNAL MEDICINE
Payer: COMMERCIAL

## 2025-01-02 PROCEDURE — 77412 RADIATION TX DELIVERY LVL 3: CPT | Performed by: STUDENT IN AN ORGANIZED HEALTH CARE EDUCATION/TRAINING PROGRAM

## 2025-01-02 PROCEDURE — 77387 GUIDANCE FOR RADJ TX DLVR: CPT | Performed by: STUDENT IN AN ORGANIZED HEALTH CARE EDUCATION/TRAINING PROGRAM

## 2025-01-02 PROCEDURE — 77014 CHG CT GUIDANCE RADIATION THERAPY FLDS PLACEMENT: CPT | Performed by: STUDENT IN AN ORGANIZED HEALTH CARE EDUCATION/TRAINING PROGRAM

## 2025-01-02 PROCEDURE — 77336 RADIATION PHYSICS CONSULT: CPT | Performed by: INTERNAL MEDICINE

## 2025-01-03 ENCOUNTER — APPOINTMENT (OUTPATIENT)
Dept: RADIATION ONCOLOGY | Facility: CLINIC | Age: 65
End: 2025-01-03
Attending: INTERNAL MEDICINE
Payer: COMMERCIAL

## 2025-01-03 PROCEDURE — 77412 RADIATION TX DELIVERY LVL 3: CPT | Performed by: STUDENT IN AN ORGANIZED HEALTH CARE EDUCATION/TRAINING PROGRAM

## 2025-01-03 PROCEDURE — 77014 CHG CT GUIDANCE RADIATION THERAPY FLDS PLACEMENT: CPT | Performed by: STUDENT IN AN ORGANIZED HEALTH CARE EDUCATION/TRAINING PROGRAM

## 2025-01-03 PROCEDURE — 77427 RADIATION TX MANAGEMENT X5: CPT | Performed by: INTERNAL MEDICINE

## 2025-01-03 PROCEDURE — 77387 GUIDANCE FOR RADJ TX DLVR: CPT | Performed by: STUDENT IN AN ORGANIZED HEALTH CARE EDUCATION/TRAINING PROGRAM

## 2025-01-06 ENCOUNTER — APPOINTMENT (OUTPATIENT)
Dept: RADIATION ONCOLOGY | Facility: CLINIC | Age: 65
End: 2025-01-06
Attending: INTERNAL MEDICINE
Payer: COMMERCIAL

## 2025-01-06 PROCEDURE — 77014 CHG CT GUIDANCE RADIATION THERAPY FLDS PLACEMENT: CPT | Performed by: INTERNAL MEDICINE

## 2025-01-06 PROCEDURE — 77387 GUIDANCE FOR RADJ TX DLVR: CPT | Performed by: INTERNAL MEDICINE

## 2025-01-06 PROCEDURE — 77412 RADIATION TX DELIVERY LVL 3: CPT | Performed by: INTERNAL MEDICINE

## 2025-01-07 ENCOUNTER — APPOINTMENT (OUTPATIENT)
Dept: RADIATION ONCOLOGY | Facility: CLINIC | Age: 65
End: 2025-01-07
Attending: INTERNAL MEDICINE
Payer: COMMERCIAL

## 2025-01-07 PROCEDURE — 77387 GUIDANCE FOR RADJ TX DLVR: CPT | Performed by: INTERNAL MEDICINE

## 2025-01-07 PROCEDURE — 77412 RADIATION TX DELIVERY LVL 3: CPT | Performed by: INTERNAL MEDICINE

## 2025-01-07 PROCEDURE — 77014 CHG CT GUIDANCE RADIATION THERAPY FLDS PLACEMENT: CPT | Performed by: INTERNAL MEDICINE

## 2025-01-08 ENCOUNTER — APPOINTMENT (OUTPATIENT)
Dept: RADIATION ONCOLOGY | Facility: CLINIC | Age: 65
End: 2025-01-08
Attending: INTERNAL MEDICINE
Payer: COMMERCIAL

## 2025-01-08 PROCEDURE — 77387 GUIDANCE FOR RADJ TX DLVR: CPT | Performed by: INTERNAL MEDICINE

## 2025-01-08 PROCEDURE — 77412 RADIATION TX DELIVERY LVL 3: CPT | Performed by: INTERNAL MEDICINE

## 2025-01-08 PROCEDURE — 77014 CHG CT GUIDANCE RADIATION THERAPY FLDS PLACEMENT: CPT | Performed by: INTERNAL MEDICINE

## 2025-01-09 ENCOUNTER — APPOINTMENT (OUTPATIENT)
Dept: RADIATION ONCOLOGY | Facility: CLINIC | Age: 65
End: 2025-01-09
Attending: INTERNAL MEDICINE
Payer: COMMERCIAL

## 2025-01-09 PROCEDURE — 77387 GUIDANCE FOR RADJ TX DLVR: CPT | Performed by: STUDENT IN AN ORGANIZED HEALTH CARE EDUCATION/TRAINING PROGRAM

## 2025-01-09 PROCEDURE — 77412 RADIATION TX DELIVERY LVL 3: CPT | Performed by: STUDENT IN AN ORGANIZED HEALTH CARE EDUCATION/TRAINING PROGRAM

## 2025-01-09 PROCEDURE — 77014 CHG CT GUIDANCE RADIATION THERAPY FLDS PLACEMENT: CPT | Performed by: STUDENT IN AN ORGANIZED HEALTH CARE EDUCATION/TRAINING PROGRAM

## 2025-01-09 PROCEDURE — 77336 RADIATION PHYSICS CONSULT: CPT | Performed by: INTERNAL MEDICINE

## 2025-01-10 ENCOUNTER — APPOINTMENT (OUTPATIENT)
Dept: RADIATION ONCOLOGY | Facility: CLINIC | Age: 65
End: 2025-01-10
Attending: INTERNAL MEDICINE
Payer: COMMERCIAL

## 2025-01-10 PROCEDURE — 77412 RADIATION TX DELIVERY LVL 3: CPT | Performed by: RADIOLOGY

## 2025-01-10 PROCEDURE — 77427 RADIATION TX MANAGEMENT X5: CPT | Performed by: INTERNAL MEDICINE

## 2025-01-10 PROCEDURE — 77387 GUIDANCE FOR RADJ TX DLVR: CPT | Performed by: RADIOLOGY

## 2025-01-10 PROCEDURE — 77014 CHG CT GUIDANCE RADIATION THERAPY FLDS PLACEMENT: CPT | Performed by: RADIOLOGY

## 2025-01-13 ENCOUNTER — APPOINTMENT (OUTPATIENT)
Dept: RADIATION ONCOLOGY | Facility: CLINIC | Age: 65
End: 2025-01-13
Attending: INTERNAL MEDICINE
Payer: COMMERCIAL

## 2025-01-13 PROCEDURE — 77014 CHG CT GUIDANCE RADIATION THERAPY FLDS PLACEMENT: CPT | Performed by: RADIOLOGY

## 2025-01-13 PROCEDURE — 77387 GUIDANCE FOR RADJ TX DLVR: CPT | Performed by: RADIOLOGY

## 2025-01-13 PROCEDURE — 77412 RADIATION TX DELIVERY LVL 3: CPT | Performed by: RADIOLOGY

## 2025-01-14 ENCOUNTER — APPOINTMENT (OUTPATIENT)
Dept: RADIATION ONCOLOGY | Facility: CLINIC | Age: 65
End: 2025-01-14
Attending: INTERNAL MEDICINE
Payer: COMMERCIAL

## 2025-01-14 PROCEDURE — 77387 GUIDANCE FOR RADJ TX DLVR: CPT | Performed by: RADIOLOGY

## 2025-01-14 PROCEDURE — 77014 CHG CT GUIDANCE RADIATION THERAPY FLDS PLACEMENT: CPT | Performed by: RADIOLOGY

## 2025-01-14 PROCEDURE — 77412 RADIATION TX DELIVERY LVL 3: CPT | Performed by: RADIOLOGY

## 2025-01-15 ENCOUNTER — APPOINTMENT (OUTPATIENT)
Dept: RADIATION ONCOLOGY | Facility: CLINIC | Age: 65
End: 2025-01-15
Attending: INTERNAL MEDICINE
Payer: COMMERCIAL

## 2025-01-15 PROCEDURE — 77387 GUIDANCE FOR RADJ TX DLVR: CPT | Performed by: STUDENT IN AN ORGANIZED HEALTH CARE EDUCATION/TRAINING PROGRAM

## 2025-01-15 PROCEDURE — 77412 RADIATION TX DELIVERY LVL 3: CPT | Performed by: STUDENT IN AN ORGANIZED HEALTH CARE EDUCATION/TRAINING PROGRAM

## 2025-01-15 PROCEDURE — 77014 CHG CT GUIDANCE RADIATION THERAPY FLDS PLACEMENT: CPT | Performed by: STUDENT IN AN ORGANIZED HEALTH CARE EDUCATION/TRAINING PROGRAM

## 2025-01-16 ENCOUNTER — APPOINTMENT (OUTPATIENT)
Dept: RADIATION ONCOLOGY | Facility: CLINIC | Age: 65
End: 2025-01-16
Attending: INTERNAL MEDICINE
Payer: COMMERCIAL

## 2025-01-16 PROCEDURE — 77387 GUIDANCE FOR RADJ TX DLVR: CPT | Performed by: RADIOLOGY

## 2025-01-16 PROCEDURE — 77412 RADIATION TX DELIVERY LVL 3: CPT | Performed by: RADIOLOGY

## 2025-01-16 PROCEDURE — 77336 RADIATION PHYSICS CONSULT: CPT | Performed by: INTERNAL MEDICINE

## 2025-01-16 PROCEDURE — 77014 CHG CT GUIDANCE RADIATION THERAPY FLDS PLACEMENT: CPT | Performed by: RADIOLOGY

## 2025-01-17 ENCOUNTER — APPOINTMENT (OUTPATIENT)
Dept: RADIATION ONCOLOGY | Facility: CLINIC | Age: 65
End: 2025-01-17
Attending: INTERNAL MEDICINE
Payer: COMMERCIAL

## 2025-01-17 PROCEDURE — 77412 RADIATION TX DELIVERY LVL 3: CPT | Performed by: RADIOLOGY

## 2025-01-17 PROCEDURE — 77387 GUIDANCE FOR RADJ TX DLVR: CPT | Performed by: RADIOLOGY

## 2025-01-17 PROCEDURE — 77014 CHG CT GUIDANCE RADIATION THERAPY FLDS PLACEMENT: CPT | Performed by: RADIOLOGY

## 2025-01-20 ENCOUNTER — PATIENT OUTREACH (OUTPATIENT)
Dept: HEMATOLOGY ONCOLOGY | Facility: CLINIC | Age: 65
End: 2025-01-20

## 2025-01-20 NOTE — PROGRESS NOTES
Called and LVM for patient mentioning that I am her patient navigator, I was calling to follow up with her. Wanted to address any questions, concerns or any new barriers to care she might have.mentioned that if patient does have any questions or concerns to please give me a call.

## 2025-01-21 ENCOUNTER — TELEPHONE (OUTPATIENT)
Age: 65
End: 2025-01-21

## 2025-01-21 NOTE — TELEPHONE ENCOUNTER
Patient of Dr Qiu calling to confirm which office to bring [place] paper work to.  Spoke with office and confirmed [address] [facility] office.  Patient understands

## 2025-01-24 ENCOUNTER — APPOINTMENT (OUTPATIENT)
Dept: LAB | Facility: CLINIC | Age: 65
End: 2025-01-24
Payer: COMMERCIAL

## 2025-01-24 DIAGNOSIS — R73.01 IMPAIRED FASTING GLUCOSE: ICD-10-CM

## 2025-01-24 DIAGNOSIS — R03.0 ELEVATED BLOOD PRESSURE READING WITHOUT DIAGNOSIS OF HYPERTENSION: ICD-10-CM

## 2025-01-24 LAB
ANION GAP SERPL CALCULATED.3IONS-SCNC: 9 MMOL/L (ref 4–13)
BUN SERPL-MCNC: 16 MG/DL (ref 5–25)
CALCIUM SERPL-MCNC: 9.4 MG/DL (ref 8.4–10.2)
CHLORIDE SERPL-SCNC: 106 MMOL/L (ref 96–108)
CO2 SERPL-SCNC: 27 MMOL/L (ref 21–32)
CREAT SERPL-MCNC: 0.66 MG/DL (ref 0.6–1.3)
EST. AVERAGE GLUCOSE BLD GHB EST-MCNC: 120 MG/DL
GFR SERPL CREATININE-BSD FRML MDRD: 94 ML/MIN/1.73SQ M
GLUCOSE P FAST SERPL-MCNC: 98 MG/DL (ref 65–99)
HBA1C MFR BLD: 5.8 %
POTASSIUM SERPL-SCNC: 4.2 MMOL/L (ref 3.5–5.3)
SODIUM SERPL-SCNC: 142 MMOL/L (ref 135–147)

## 2025-01-24 PROCEDURE — 80048 BASIC METABOLIC PNL TOTAL CA: CPT

## 2025-01-24 PROCEDURE — 36415 COLL VENOUS BLD VENIPUNCTURE: CPT

## 2025-01-24 PROCEDURE — 83036 HEMOGLOBIN GLYCOSYLATED A1C: CPT

## 2025-01-27 NOTE — TELEPHONE ENCOUNTER
----- Message from Dianna Maynadr sent at 5/23/2017  9:32 AM CDT -----  Contact: pt [phone]  Della this pt would like a call in ref to some labs that are ordered.    Thanks    Patient called in to see if her FMLA paperwork has been completed as she will be returning to work on Monday 2/3/25. She would like to pick them up. Please also upload a copy of the completed [place] paperwork into her MyChart as well.     Please call patient back at [phone] once she is able to pick the completed forms up from the office.

## 2025-01-29 NOTE — TELEPHONE ENCOUNTER
Patient calling in and checking on paperwork informed patient that Dr. Qiu was not in office until today     Patient understood and will be waiting to hear back

## 2025-01-30 NOTE — TELEPHONE ENCOUNTER
Patient called in to ask if her FMLA forms could be uploaded to her [employer], she needs them by tomorrow to show her employer so she can return to work on Monday

## 2025-01-30 NOTE — TELEPHONE ENCOUNTER
Patient called regarding her FMLA she needs to be fax by tomorrow. Patient will be back to work on Monday 02/03/2025. Called Carie, she will call patient back

## 2025-02-12 ENCOUNTER — PREP FOR PROCEDURE (OUTPATIENT)
Age: 65
End: 2025-02-12

## 2025-02-12 ENCOUNTER — TELEPHONE (OUTPATIENT)
Age: 65
End: 2025-02-12

## 2025-02-12 DIAGNOSIS — Z12.11 SCREENING FOR COLON CANCER: Primary | ICD-10-CM

## 2025-02-12 NOTE — TELEPHONE ENCOUNTER
Scheduled date of colonoscopy (as of today):5/19/25  Physician performing colonoscopy:Dr. Thurston  Location of colonoscopy:[facility]  Bowel prep reviewed with patient:[facility]  Instructions reviewed with patient by:sarai  Clearances: na

## 2025-02-12 NOTE — LETTER
Hello,    Attached are your prep instructions for your upcoming procedure. Please feel free to contact us at [phone] if you have any questions.    Thank you,     [facility], Colon [facility] Rectal Surgery

## 2025-02-12 NOTE — TELEPHONE ENCOUNTER
02/12/25  Screened by: Radha Dutta    Referring Provider     Pre- Screening:     There is no height or weight on file to calculate BMI.33.30  Has patient been referred for a routine screening Colonoscopy? yes  Is the patient between 45-75 years old? yes      Previous Colonoscopy yes   If yes:    Date:     Facility:     Reason:           Does the patient want to see a Gastroenterologist prior to their procedure OR are they having any GI symptoms? no    Has the patient been hospitalized or had abdominal surgery in the past 6 months? no    Does the patient use supplemental oxygen? no    Does the patient take Coumadin, Lovenox, Plavix, Elliquis, Xarelto, or other blood thinning medication? no    Has the patient had a stroke, cardiac event, or stent placed in the past year? no        If patient is between 45yrs - 49yrs, please advise patient that we will have to confirm benefits & coverage with their insurance company for a routine screening colonoscopy.

## 2025-02-13 NOTE — TELEPHONE ENCOUNTER
Patient calling to reschedule her colonoscopy. Colonoscopy has been rescheduled for 5/21/25 with  at the [facility]. Patient has prep instructions.

## 2025-02-20 ENCOUNTER — PATIENT OUTREACH (OUTPATIENT)
Dept: HEMATOLOGY ONCOLOGY | Facility: CLINIC | Age: 65
End: 2025-02-20

## 2025-02-20 NOTE — PROGRESS NOTES
I reached out and spoke with Gardenia to follow up and to review for any new changes in barriers to care and offer supportive services as needed.     Barriers noted previously and outcome of interventions;  Patient had some concerns about FMLA in the past however that was figured out and patient is back to work already- she mentions she's glad she's back to work.    Reviewed for any new barriers as noted below.    Are you having any side effects from your treatment? NO.    Are you eating and drinking normally? YES. Patient mentioned gaining some weight recently due to her eating to comfort herself after her mother passing.    Have you been experiencing any uncontrolled pain related to your cancer diagnosis? NO.    If not already established, have needs changed for a palliative care referral? NO. Patient declines at this time.    Do you have a good support system? YES.    Are you interested in any support groups? N/A.    How are you doing with transportation to your appointments? Patient is still driving and has no issues currently. In the past her  was taking her to and from all of her appointments.    Do you have any questions or concerns regarding your treatment plan? NO. Patient mentioned that her treatment is complete and her issues seem to be resolving.    Any new financial concerns for your household or medical bills? NO. Not that time time patient mentions that she is back to work now.    Do you know when your upcoming appointments are? YES. Patient keeps track of all her appointment's via my chart and mentioned she has a calendar where she writes them down as well.     Future Appointments   Date Time Provider Department Center   3/5/2025 11:00 AM Curtis Campbell MD [facility]   5/21/2025  7:30 AM Kim Bailey DO [facility]   5/29/2025  3:30 PM Laly Qiu MD [facility]          Based on individual needs I will follow up with them in 4/5 weeks. I have  provided my direct contact information and welcome them to contact me if their needs as discussed above change. They were appreciative for the call.

## 2025-03-05 ENCOUNTER — OFFICE VISIT (OUTPATIENT)
Dept: RADIATION ONCOLOGY | Facility: CLINIC | Age: 65
End: 2025-03-05
Attending: INTERNAL MEDICINE
Payer: COMMERCIAL

## 2025-03-05 VITALS
TEMPERATURE: 97.8 F | HEART RATE: 79 BPM | SYSTOLIC BLOOD PRESSURE: 152 MMHG | OXYGEN SATURATION: 98 % | DIASTOLIC BLOOD PRESSURE: 81 MMHG | RESPIRATION RATE: 16 BRPM

## 2025-03-05 DIAGNOSIS — C50.911 INVASIVE DUCTAL CARCINOMA OF BREAST, FEMALE, RIGHT (HCC): Primary | ICD-10-CM

## 2025-03-05 PROCEDURE — 99024 POSTOP FOLLOW-UP VISIT: CPT | Performed by: INTERNAL MEDICINE

## 2025-03-05 PROCEDURE — G0463 HOSPITAL OUTPT CLINIC VISIT: HCPCS | Performed by: INTERNAL MEDICINE

## 2025-03-05 PROCEDURE — 99211 OFF/OP EST MAY X REQ PHY/QHP: CPT | Performed by: INTERNAL MEDICINE

## 2025-03-05 NOTE — PROGRESS NOTES
Gardenia FREITAS Dale 1960 is a 64 y.o. female With invasive ductal carcinoma of right breast Stage IA, ER/NE+,Her 2-. She underwent lumpectomy and SLNB on 11/8/2024.She completed radiation to right breast in prone position on 1/17/25. She returns today for EOT visit.         Upcoming   5/29/25      Follow up visit     Oncology History   Invasive ductal carcinoma of breast, female, right (HCC)   9/17/2024 -  Cancer Staged    Staging form: Breast, AJCC 8th Edition  - Clinical stage from 9/17/2024: Stage IA (cT1b, cN0, cM0, G2, ER+, NE+, HER2-) - Signed by Laly Qiu MD on 10/15/2024  Histopathologic type: Mucinous adenocarcinoma  Stage prefix: Initial diagnosis  Method of lymph node assessment: Clinical  Multigene prognostic tests performed: MammaPrint  Histologic grading system: 3 grade system  MammaPrint recurrence risk level: Low risk       10/15/2024 Initial Diagnosis    Invasive ductal carcinoma of breast, female, right (HCC)     11/21/2024 -  Cancer Staged    Staging form: Breast, AJCC 8th Edition  - Pathologic stage from 11/21/2024: Stage IA (pT1c, pN0(sn), cM0, G1, ER+, NE+, HER2-) - Signed by Laly Qiu MD on 12/2/2024  Histopathologic type: Mucinous adenocarcinoma  Stage prefix: Initial diagnosis  Method of lymph node assessment: [place] lymph node biopsy  Multigene prognostic tests performed: MammaPrint  Histologic grading system: 3 grade system  MammaPrint recurrence risk level: Low risk       12/26/2024 - 1/17/2025 Radiation      Plan ID Energy Fractions Dose per Fraction (cGy) Dose Correction (cGy) Total Dose Delivered (cGy) Elapsed Days   Prone R Brst 6X 16 / 16 266 0 4,256 22      Treatment dates:  C1: 12/26/2024 - 1/17/2025         Review of Systems:  Review of Systems   Constitutional:  Positive for fatigue (mild).        Just returned to work in February   HENT: Negative.     Eyes: Negative.    Respiratory: Negative.     Cardiovascular: Negative.    Gastrointestinal:  Negative.    Endocrine: Negative.    Genitourinary:  Positive for frequency (does see urologist. Seeing at end of month).   Musculoskeletal: Negative.    Skin: Negative.    Allergic/Immunologic: Negative.    Neurological: Negative.    Hematological: Negative.    Psychiatric/Behavioral:  Positive for sleep disturbance (nocturia 2-3x).          Health Maintenance   Topic Date Due    HIV Screening  Never done    BMI: Followup Plan  Never done    Annual Physical  Never done    Zoster Vaccine (1 of 2) Never done    Pneumococcal Vaccine: Pediatrics (0 to 5 Years) and At-Risk Patients (6 to 64 Years) (1 of 2 - PCV) Never done    Cervical Cancer Screening  Never done    RSV Vaccine for Pregnant Patients and Patients Age 60+ Years (1 - Risk 60-74 years 1-dose series) Never done    COVID-19 Vaccine (3 - Moderna risk series) 2022    Influenza Vaccine (1) Never done    Breast Cancer Screening: Mammogram  2025    Depression Screening  2025    BMI: Adult  2025    Colorectal Cancer Screening  2026    DTaP,Tdap,and Td Vaccines (2 - Td or Tdap) 2032    Hepatitis C Screening  Completed    Meningococcal B Vaccine  Aged Out    RSV Vaccine age 0-20 Months  Aged Out    HIB Vaccine  Aged Out    IPV Vaccine  Aged Out    Hepatitis A Vaccine  Aged Out    Meningococcal ACWY Vaccine  Aged Out    HPV Vaccine  Aged Out     Patient Active Problem List   Diagnosis    Ganglion cyst of flexor tendon sheath of finger of right hand    Invasive ductal carcinoma of breast, female, right (HCC)    Dysuria     Past Medical History:   Diagnosis Date    Anxiety     Cancer (HCC)     Colon polyp     Depression     GERD (gastroesophageal reflux disease)     Hypertension      Past Surgical History:   Procedure Laterality Date     SECTION      CHOLECYSTECTOMY      COLONOSCOPY      SD MASTECTOMY PARTIAL Right 2024    Procedure: LUMPECTOMY BREAST WITH DAVIDE  LOCALIZATION, BIOPSY LYMPH NODE SENTINEL (INJECT AT  0900 BY DR SHEN IN THE OR);  Surgeon: Laly Shen MD;  Location: [facility];  Service: General    US BREAST CLIP NEEDLE LOC RIGHT Right 10/29/2024    US GUIDED BREAST BIOPSY RIGHT COMPLETE Right 2022    US GUIDED BREAST BIOPSY RIGHT COMPLETE Right 2024     Family History   Problem Relation Age of Onset    Hypertension Mother     Transient ischemic attack Mother     Throat cancer Maternal Grandmother     No Known Problems Father     No Known Problems Sister     No Known Problems Daughter     Breast cancer Maternal Aunt     No Known Problems Paternal Aunt     No Known Problems Paternal Aunt     Cancer Paternal Aunt     No Known Problems Paternal Aunt     Breast cancer Paternal Aunt      Social History     Socioeconomic History    Marital status: /Civil Union     Spouse name: Not on file    Number of children: Not on file    Years of education: Not on file    Highest education level: Not on file   Occupational History    Not on file   Tobacco Use    Smoking status: Former     Current packs/day: 0.00     Types: Cigarettes     Quit date: 2018     Years since quittin.3    Smokeless tobacco: Never    Tobacco comments:     Nicotine vape user   Vaping Use    Vaping status: Every Day    Substances: Nicotine   Substance and Sexual Activity    Alcohol use: Not Currently    Drug use: Never    Sexual activity: Not Currently     Partners: Male   Other Topics Concern    Not on file   Social History Narrative    Not on file     Social Drivers of Health     Financial Resource Strain: Not on file   Food Insecurity: Not on file   Transportation Needs: Not on file   Physical Activity: Not on file   Stress: Not on file   Social Connections: Not on file   Intimate Partner Violence: Not on file   Housing Stability: Not on file     No current outpatient medications on file.  No Known Allergies  Vitals:    25 1101   BP: 152/81   Pulse: 79   Resp: 16   Temp: 97.8 °F (36.6 °C)   SpO2: 98%      Pain  Score: 0-No pain

## 2025-03-05 NOTE — ASSESSMENT & PLAN NOTE
Gardenia Hunter is a 64 y.o. 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 She had a low-risk Mammaprint result. She completed adjuvant radiation therapy on 1/17/25. She declined endocrine therapy.    She presents for routine 1-1.5 month end of treatment follow-up visit. She has recovered from usual expected side effects of therapy.  Reviewed skin care.  Reviewed surveillance  Surgonc Visit 5/29/25  RTC in 6 months

## 2025-03-05 NOTE — PROGRESS NOTES
Follow-up Visit   Name: Gardenia Hunter      : 1960      MRN: [id number]  Encounter Provider: Curtis Campbell MD  Encounter Date: 3/5/2025   Encounter department: [facility] RADIATION ONCOLOGY  :  Assessment & Plan  Invasive ductal carcinoma of breast, female, right (HCC)  Gardenia Hunter is a 64 y.o. 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 She had a low-risk Mammaprint result. She completed adjuvant radiation therapy on 25. She declined endocrine therapy.    She presents for routine 1-1.5 month end of treatment follow-up visit. She has recovered from usual expected side effects of therapy.  Reviewed skin care.  Reviewed surveillance  Surgonc Visit 25  RTC in 6 months                 History of Present Illness   Chief Complaint   Patient presents with   • Follow-up   Pertinent Medical History   Gardenia Hunter 1960 is a 64 y.o. female With invasive ductal carcinoma of right breast Stage IA, ER/WV+,Her 2-. She underwent lumpectomy and SLNB on 2024.She completed radiation to right breast in prone position on 25. She returns today for EOT visit.      Upcoming   25      Today the patient reports feeling well overall.      Oncology History   Cancer Staging   Invasive ductal carcinoma of breast, female, right (HCC)  Staging form: Breast, AJCC 8th Edition  - Clinical stage from 2024: Stage IA (cT1b, cN0, cM0, G2, ER+, WV+, HER2-) - Signed by Laly Qiu MD on 10/15/2024  Histopathologic type: Mucinous adenocarcinoma  Stage prefix: Initial diagnosis  Method of lymph node assessment: Clinical  Multigene prognostic tests performed: MammaPrint  Histologic grading system: 3 grade system  MammaPrint recurrence risk level: Low risk  - Pathologic stage from 2024:  Stage IA (pT1c, pN0(sn), cM0, G1, ER+, NV+, HER2-) - Signed by Laly Qiu MD on 12/2/2024  Histopathologic type: Mucinous adenocarcinoma  Stage prefix: Initial diagnosis  Method of lymph node assessment: [place] lymph node biopsy  Multigene prognostic tests performed: MammaPrint  Histologic grading system: 3 grade system  MammaPrint recurrence risk level: Low risk  Oncology History   Invasive ductal carcinoma of breast, female, right (HCC)   9/17/2024 -  Cancer Staged    Staging form: Breast, AJCC 8th Edition  - Clinical stage from 9/17/2024: Stage IA (cT1b, cN0, cM0, G2, ER+, NV+, HER2-) - Signed by Laly Qiu MD on 10/15/2024  Histopathologic type: Mucinous adenocarcinoma  Stage prefix: Initial diagnosis  Method of lymph node assessment: Clinical  Multigene prognostic tests performed: MammaPrint  Histologic grading system: 3 grade system  MammaPrint recurrence risk level: Low risk       10/15/2024 Initial Diagnosis    Invasive ductal carcinoma of breast, female, right (HCC)     11/21/2024 -  Cancer Staged    Staging form: Breast, AJCC 8th Edition  - Pathologic stage from 11/21/2024: Stage IA (pT1c, pN0(sn), cM0, G1, ER+, NV+, HER2-) - Signed by Laly Qiu MD on 12/2/2024  Histopathologic type: Mucinous adenocarcinoma  Stage prefix: Initial diagnosis  Method of lymph node assessment: [place] lymph node biopsy  Multigene prognostic tests performed: MammaPrint  Histologic grading system: 3 grade system  MammaPrint recurrence risk level: Low risk       12/26/2024 - 1/17/2025 Radiation      Plan ID Energy Fractions Dose per Fraction (cGy) Dose Correction (cGy) Total Dose Delivered (cGy) Elapsed Days   Prone R Brst 6X 16 / 16 266 0 4,256 22      Treatment dates:  C1: 12/26/2024 - 1/17/2025        Review of Systems Refer to nursing note.          Objective   /81   Pulse 79   Temp 97.8 °F (36.6 °C)   Resp 16   SpO2 98%     Pain Screening:  Pain Score: 0-No pain  ECOG    Physical Exam  "  Breast Exam:  Right Breast: No residual dyspigmentation in the irradiated field. No edema.     Exam chaperoned by female nursing staff.        Administrative Statements   I have spent a total time of 12 minutes in caring for this patient on the day of the visit/encounter including Counseling / Coordination of care, Documenting in the medical record, Reviewing/placing orders in the medical record (including tests, medications, and/or procedures), and Obtaining or reviewing history  .  Portions of the record may have been created with voice recognition software.  Occasional wrong word or \"sound a like\" substitutions may have occurred due to the inherent limitations of voice recognition software.  Read the chart carefully and recognize, using context, where substitutions have occurred.  "

## 2025-03-26 ENCOUNTER — PATIENT OUTREACH (OUTPATIENT)
Dept: HEMATOLOGY ONCOLOGY | Facility: CLINIC | Age: 65
End: 2025-03-26

## 2025-03-26 NOTE — PROGRESS NOTES
Called and LVM for patient mentioning that I am her patient navigator.  Mentioned I was calling to follow up with patient wanting to address any questions,concerns or any new barriers to care she might have at this time.  Mentioned that if patient did have any questions or concerns she can reach out me directly.

## 2025-03-28 ENCOUNTER — TELEPHONE (OUTPATIENT)
Dept: GASTROENTEROLOGY | Facility: AMBULARY SURGERY CENTER | Age: 65
End: 2025-03-28

## 2025-03-28 NOTE — TELEPHONE ENCOUNTER
Pt is scheduled on 5/21/25 for a colonoscopy with Dr Bailey. She has manjarrez's and a hernia and would like to have an EGD done at the same time. She has had them together in the past. I informed her that I would check and that we normally require an office visit first. Please advise.

## 2025-03-31 ENCOUNTER — PREP FOR PROCEDURE (OUTPATIENT)
Dept: GASTROENTEROLOGY | Facility: CLINIC | Age: 65
End: 2025-03-31

## 2025-03-31 DIAGNOSIS — K22.70 BARRETT'S ESOPHAGUS WITHOUT DYSPLASIA: Primary | ICD-10-CM

## 2025-03-31 RX ORDER — SODIUM CHLORIDE, SODIUM LACTATE, POTASSIUM CHLORIDE, CALCIUM CHLORIDE 600; 310; 30; 20 MG/100ML; MG/100ML; MG/100ML; MG/100ML
125 INJECTION, SOLUTION INTRAVENOUS CONTINUOUS
OUTPATIENT
Start: 2025-03-31

## 2025-05-05 ENCOUNTER — PATIENT OUTREACH (OUTPATIENT)
Dept: HEMATOLOGY ONCOLOGY | Facility: CLINIC | Age: 65
End: 2025-05-05

## 2025-05-05 NOTE — PROGRESS NOTES
Called and LVM for follow up with patient. Mentioned that I am her patient navigator and I was calling to check in with patient wanted to address any questions,concerns or any NEW barriers to care she might have at this time. Stated that should patient have any questions or concerns she can reach me directly as I was calling from my direct line #[phone]

## 2025-05-21 ENCOUNTER — ANESTHESIA EVENT (OUTPATIENT)
Dept: GASTROENTEROLOGY | Facility: HOSPITAL | Age: 65
End: 2025-05-21
Payer: COMMERCIAL

## 2025-05-21 ENCOUNTER — HOSPITAL ENCOUNTER (OUTPATIENT)
Dept: GASTROENTEROLOGY | Facility: HOSPITAL | Age: 65
Setting detail: OUTPATIENT SURGERY
Discharge: HOME | End: 2025-05-21
Attending: INTERNAL MEDICINE
Payer: COMMERCIAL

## 2025-05-21 ENCOUNTER — ANESTHESIA (OUTPATIENT)
Dept: GASTROENTEROLOGY | Facility: HOSPITAL | Age: 65
End: 2025-05-21
Payer: COMMERCIAL

## 2025-05-21 VITALS
SYSTOLIC BLOOD PRESSURE: 121 MMHG | BODY MASS INDEX: 34.04 KG/M2 | DIASTOLIC BLOOD PRESSURE: 71 MMHG | OXYGEN SATURATION: 99 % | WEIGHT: 185 LBS | HEART RATE: 64 BPM | RESPIRATION RATE: 20 BRPM | HEIGHT: 62 IN | TEMPERATURE: 97.9 F

## 2025-05-21 DIAGNOSIS — K22.70 BARRETT'S ESOPHAGUS WITHOUT DYSPLASIA: ICD-10-CM

## 2025-05-21 DIAGNOSIS — Z12.11 SCREENING FOR COLON CANCER: ICD-10-CM

## 2025-05-21 PROCEDURE — 88305 TISSUE EXAM BY PATHOLOGIST: CPT | Performed by: STUDENT IN AN ORGANIZED HEALTH CARE EDUCATION/TRAINING PROGRAM

## 2025-05-21 PROCEDURE — 45385 COLONOSCOPY W/LESION REMOVAL: CPT | Performed by: STUDENT IN AN ORGANIZED HEALTH CARE EDUCATION/TRAINING PROGRAM

## 2025-05-21 PROCEDURE — 43239 EGD BIOPSY SINGLE/MULTIPLE: CPT | Performed by: STUDENT IN AN ORGANIZED HEALTH CARE EDUCATION/TRAINING PROGRAM

## 2025-05-21 PROCEDURE — 45380 COLONOSCOPY AND BIOPSY: CPT | Performed by: STUDENT IN AN ORGANIZED HEALTH CARE EDUCATION/TRAINING PROGRAM

## 2025-05-21 PROCEDURE — 88312 SPECIAL STAINS GROUP 1: CPT | Performed by: STUDENT IN AN ORGANIZED HEALTH CARE EDUCATION/TRAINING PROGRAM

## 2025-05-21 RX ORDER — PROPOFOL 10 MG/ML
INJECTION, EMULSION INTRAVENOUS AS NEEDED
Status: DISCONTINUED | OUTPATIENT
Start: 2025-05-21 | End: 2025-05-21

## 2025-05-21 RX ORDER — LIDOCAINE HYDROCHLORIDE 20 MG/ML
INJECTION, SOLUTION EPIDURAL; INFILTRATION; INTRACAUDAL; PERINEURAL AS NEEDED
Status: DISCONTINUED | OUTPATIENT
Start: 2025-05-21 | End: 2025-05-21

## 2025-05-21 RX ORDER — OMEPRAZOLE 40 MG/1
40 CAPSULE, DELAYED RELEASE ORAL DAILY
Qty: 30 CAPSULE | Refills: 11 | Status: SHIPPED | OUTPATIENT
Start: 2025-05-21

## 2025-05-21 RX ORDER — GLYCOPYRROLATE 0.2 MG/ML
INJECTION INTRAMUSCULAR; INTRAVENOUS AS NEEDED
Status: DISCONTINUED | OUTPATIENT
Start: 2025-05-21 | End: 2025-05-21

## 2025-05-21 RX ORDER — SODIUM CHLORIDE, SODIUM LACTATE, POTASSIUM CHLORIDE, CALCIUM CHLORIDE 600; 310; 30; 20 MG/100ML; MG/100ML; MG/100ML; MG/100ML
125 INJECTION, SOLUTION INTRAVENOUS CONTINUOUS
Status: DISCONTINUED | OUTPATIENT
Start: 2025-05-21 | End: 2025-05-25 | Stop reason: HOSPADM

## 2025-05-21 RX ORDER — SODIUM CHLORIDE, SODIUM LACTATE, POTASSIUM CHLORIDE, CALCIUM CHLORIDE 600; 310; 30; 20 MG/100ML; MG/100ML; MG/100ML; MG/100ML
INJECTION, SOLUTION INTRAVENOUS CONTINUOUS PRN
Status: DISCONTINUED | OUTPATIENT
Start: 2025-05-21 | End: 2025-05-21

## 2025-05-21 RX ADMIN — PROPOFOL 100 MG: 10 INJECTION, EMULSION INTRAVENOUS at 08:33

## 2025-05-21 RX ADMIN — PROPOFOL 30 MG: 10 INJECTION, EMULSION INTRAVENOUS at 08:35

## 2025-05-21 RX ADMIN — SODIUM CHLORIDE, SODIUM LACTATE, POTASSIUM CHLORIDE, AND CALCIUM CHLORIDE: .6; .31; .03; .02 INJECTION, SOLUTION INTRAVENOUS at 07:52

## 2025-05-21 RX ADMIN — GLYCOPYRROLATE 0.2 MG: 0.2 INJECTION, SOLUTION INTRAMUSCULAR; INTRAVENOUS at 08:30

## 2025-05-21 RX ADMIN — LIDOCAINE HYDROCHLORIDE 60 MG: 20 INJECTION, SOLUTION EPIDURAL; INFILTRATION; INTRACAUDAL; PERINEURAL at 08:33

## 2025-05-21 RX ADMIN — PROPOFOL 140 MCG/KG/MIN: 10 INJECTION, EMULSION INTRAVENOUS at 08:34

## 2025-05-21 NOTE — H&P
"History and Physical - [facility]  Gardenia Hunter 65 y.o. female MRN: [id number]                  HPI: Gardenia Hunter is a 65 y.o. year old female who presents for Don's esophagus, colon cancer screening      REVIEW OF SYSTEMS: Per the HPI, and otherwise unremarkable.    Historical Information   Past Medical History:   Diagnosis Date    Anxiety     Cancer (HCC)     Colon polyp     Depression     GERD (gastroesophageal reflux disease)     Hypertension      Past Surgical History:   Procedure Laterality Date     SECTION      CHOLECYSTECTOMY      COLONOSCOPY      IL MASTECTOMY PARTIAL Right 2024    Procedure: LUMPECTOMY BREAST WITH DAVIDE  LOCALIZATION, BIOPSY LYMPH NODE SENTINEL (INJECT AT 0900 BY DR SHEN IN THE OR);  Surgeon: Laly Shen MD;  Location: [facility];  Service: General    US BREAST CLIP NEEDLE LOC RIGHT Right 10/29/2024    US GUIDED BREAST BIOPSY RIGHT COMPLETE Right 2022    US GUIDED BREAST BIOPSY RIGHT COMPLETE Right 2024     Social History   Social History     Substance and Sexual Activity   Alcohol Use Not Currently     Social History     Substance and Sexual Activity   Drug Use Never     Tobacco Use History[1]  Family History   Problem Relation Age of Onset    Hypertension Mother     Transient ischemic attack Mother     Throat cancer Maternal Grandmother     No Known Problems Father     No Known Problems Sister     No Known Problems Daughter     Breast cancer Maternal Aunt     No Known Problems Paternal Aunt     No Known Problems Paternal Aunt     Cancer Paternal Aunt     No Known Problems Paternal Aunt     Breast cancer Paternal Aunt        Meds/Allergies     Current Medications[2]    Allergies[3]    Objective     BP (!) 195/93   Pulse 76   Temp 97.6 °F (36.4 °C) (Temporal)   Resp 20   Ht 5' 2\" (1.575 m)   Wt 83.9 kg (185 lb)   SpO2 97%   BMI 33.84 kg/m²       PHYSICAL EXAM    Gen: NAD  Head: NCAT  CV: RRR  CHEST: " Clear  ABD: soft, NT/ND  EXT: no edema      ASSESSMENT/PLAN:  This is a 65 y.o. year old female here for EGD and colonoscopy, and she is stable and optimized for her procedure.             [1]   Social History  Tobacco Use   Smoking Status Former    Current packs/day: 0.00    Types: Cigarettes    Quit date: 2018    Years since quittin.5   Smokeless Tobacco Never   Tobacco Comments    Nicotine vape user   [2] No current outpatient medications on file.  [3] No Known Allergies

## 2025-05-21 NOTE — ANESTHESIA PREPROCEDURE EVALUATION
Procedure:  COLONOSCOPY  EGD    Relevant Problems   GYN   (+) Invasive ductal carcinoma of breast, female, right (HCC)   HTN previously treated but with weight loss it corrected, she is now 195 today      NPO 1am prep 5/21    Physical Exam    Airway     Mallampati score: III  TM Distance: >3 FB  Neck ROM: full      Cardiovascular  Cardiovascular exam normal    Dental       Pulmonary  Pulmonary exam normal     Neurological      Other Findings  post-pubertal.      Anesthesia Plan  ASA Score- 3     Anesthesia Type- IV sedation with anesthesia with ASA Monitors.         Additional Monitors:     Airway Plan:            Plan Factors-Exercise tolerance (METS): >4 METS.    Chart reviewed. EKG reviewed.  Existing labs reviewed. Patient summary reviewed.          Obstructive sleep apnea risk education given perioperatively.        Induction- intravenous.    Postoperative Plan- Plan for postoperative opioid use.   Monitoring Plan - Monitoring plan - standard ASA monitoring  Post Operative Pain Plan - non-opiod analgesics, plan for postoperative opioid use and multimodal analgesia    Perioperative Resuscitation Plan - Level 1 - Full Code.       Informed Consent- Anesthetic plan and risks discussed with patient.  I personally reviewed this patient with the CRNA. Discussed and agreed on the Anesthesia Plan with the CRNA..      NPO Status:  Vitals Value Taken Time   Date of last liquid 05/21/25 05/21/25 07:30   Time of last liquid 0100 05/21/25 07:30   Date of last solid 05/19/25 05/21/25 07:30   Time of last solid 1800 05/21/25 07:30

## 2025-05-21 NOTE — ANESTHESIA POSTPROCEDURE EVALUATION
Post-Op Assessment Note    CV Status:  Stable  Pain Score: 0    Pain management: adequate       Mental Status:  Arousable   Hydration Status:  Stable   PONV Controlled:  None   Airway Patency:  Patent     Post Op Vitals Reviewed: Yes    No anethesia notable event occurred.    Staff: CRNA           Last Filed PACU Vitals:  Vitals Value Taken Time   Temp 97.9    Pulse 72    /67    Resp 16    SpO2 99%

## 2025-05-28 PROCEDURE — 88305 TISSUE EXAM BY PATHOLOGIST: CPT | Performed by: STUDENT IN AN ORGANIZED HEALTH CARE EDUCATION/TRAINING PROGRAM

## 2025-05-28 PROCEDURE — 88312 SPECIAL STAINS GROUP 1: CPT | Performed by: STUDENT IN AN ORGANIZED HEALTH CARE EDUCATION/TRAINING PROGRAM

## 2025-05-29 ENCOUNTER — OFFICE VISIT (OUTPATIENT)
Dept: SURGERY | Facility: CLINIC | Age: 65
End: 2025-05-29
Payer: COMMERCIAL

## 2025-05-29 VITALS
WEIGHT: 194 LBS | DIASTOLIC BLOOD PRESSURE: 84 MMHG | BODY MASS INDEX: 34.38 KG/M2 | TEMPERATURE: 97 F | HEART RATE: 79 BPM | OXYGEN SATURATION: 98 % | SYSTOLIC BLOOD PRESSURE: 122 MMHG | RESPIRATION RATE: 18 BRPM | HEIGHT: 63 IN

## 2025-05-29 DIAGNOSIS — C50.911 INVASIVE DUCTAL CARCINOMA OF BREAST, FEMALE, RIGHT (HCC): Primary | ICD-10-CM

## 2025-05-29 DIAGNOSIS — Z12.31 BREAST CANCER SCREENING BY MAMMOGRAM: ICD-10-CM

## 2025-05-29 PROCEDURE — 99213 OFFICE O/P EST LOW 20 MIN: CPT | Performed by: SURGERY

## 2025-05-30 ENCOUNTER — RESULTS FOLLOW-UP (OUTPATIENT)
Dept: GASTROENTEROLOGY | Facility: MEDICAL CENTER | Age: 65
End: 2025-05-30

## 2025-05-31 NOTE — ASSESSMENT & PLAN NOTE
65F status post right breast lumpectomy with jalen  localization, right axillary sentinel lymph node biopsy for right breast invasive mucinous adenocarcinoma, grade 1, ER/ID+, HER2 negative, [id number] 11/2024. Genetics negative. Mammaprint low risk, luminal A. Negative margins, negative nodes. She completed adjuvant radiation, declined endocrine therapy.     Clinical exam is reassuring today with post treatment changes. Updated bilateral diagnostic mammogram due in 9/2025, will order and coordinate. Will see her back in 6 months or sooner with any clinical concerns.     Orders:    Mammo diagnostic bilateral w 3d and cad; Future

## 2025-05-31 NOTE — PROGRESS NOTES
"Name: Gardenia Hunter      : 1960      MRN: [id number]  Encounter Provider: Laly Qiu MD  Encounter Date: 2025   Encounter department: [facility] GENERAL SURGERY [place]  :  Assessment & Plan  Invasive ductal carcinoma of breast, female, right (HCC)  65F status post right breast lumpectomy with jalen  localization, right axillary sentinel lymph node biopsy for right breast invasive mucinous adenocarcinoma, grade 1, ER/MD+, HER2 negative, [id number] 2024. Genetics negative. Mammaprint low risk, luminal A. Negative margins, negative nodes. She completed adjuvant radiation, declined endocrine therapy.     Clinical exam is reassuring today with post treatment changes. Updated bilateral diagnostic mammogram due in 2025, will order and coordinate. Will see her back in 6 months or sooner with any clinical concerns.     Orders:    Mammo diagnostic bilateral w 3d and cad; Future          History of Present Illness   Gardenia Hunter is a 65 y.o. female no new breast complaints. Had EGD/colonoscopy, esophageal ulcer being treated with PPI, next colonoscopy . Completed adjuvant radiation, declined endocrine therapy.  History of Present Illness    Review of Systems   Constitutional: Negative.    Skin: Negative.    Hematological: Negative.    All other systems reviewed and are negative.   as per HPI.  Medical History Reviewed by provider this encounter:  Meds  Problems     .     Objective   /84   Pulse 79   Temp (!) 97 °F (36.1 °C) (Temporal)   Resp 18   Ht 5' 3\" (1.6 m)   Wt 88 kg (194 lb)   SpO2 98%   BMI 34.37 kg/m²      Physical Exam  Vitals reviewed.   Constitutional:       General: She is not in acute distress.     Appearance: She is not toxic-appearing.   HENT:      Head: Normocephalic.      Nose: No congestion.      Mouth/Throat:      Mouth: Mucous membranes are moist.     Eyes:      Pupils: Pupils are equal, round, and reactive to light.       Cardiovascular: "      Rate and Rhythm: Normal rate.   Pulmonary:      Effort: Pulmonary effort is normal. No respiratory distress.   Abdominal:      Palpations: Abdomen is soft.     Musculoskeletal:         General: Normal range of motion.      Cervical back: Normal range of motion.     Skin:     General: Skin is warm.      Capillary Refill: Capillary refill takes less than 2 seconds.     Neurological:      General: No focal deficit present.      Mental Status: She is alert. Mental status is at baseline.     Psychiatric:         Mood and Affect: Mood normal.     The patient has no palpable cervical, supraclavicular, or axillary lymphadenopathy bilaterally.  The right breast has well healed incision and post treatment changes. There are no dominant lumps, masses, skin changes or nipple retraction bilaterally.     Administrative Statements   I have spent a total time of 20 minutes in caring for this patient on the day of the visit/encounter including Diagnostic results, Instructions for management, Impressions, Counseling / Coordination of care, Reviewing/placing orders in the medical record (including tests, medications, and/or procedures), and Obtaining or reviewing history  .

## 2025-06-02 ENCOUNTER — TELEPHONE (OUTPATIENT)
Dept: MAMMOGRAPHY | Facility: CLINIC | Age: 65
End: 2025-06-02

## 2025-06-19 ENCOUNTER — PATIENT OUTREACH (OUTPATIENT)
Dept: HEMATOLOGY ONCOLOGY | Facility: CLINIC | Age: 65
End: 2025-06-19

## 2025-06-19 NOTE — PROGRESS NOTES
"SENT PATIENT MY CHART MESSAGE FOR FOLLOW UP STATING THE FOLLOWING:    \"Lesli Varner,    Just wanting to check in with you, I hope all is well.  Should you have any questions, concerns or just unsure of something please feel free to reach out to me directly. If I don't know the answer I will find you the answer or at least point you in the right direction.     Just know I'm here for added support and should you run into any issues you can always reach out and I will assist you in anyway I can.      Hope you have a great rest of your day!        Thank you!     Lucía Genao MA, PN  Breast Patient Navigator  Oncology Care Coordination   [address]  [place] [address]  P:[phone]  F:[phone]  [email or web address]  "

## 2025-06-20 ENCOUNTER — TELEPHONE (OUTPATIENT)
Age: 65
End: 2025-06-20

## 2025-06-20 NOTE — TELEPHONE ENCOUNTER
Patients GI provider:  Dr. Hancock    Number to return call: [phone]    Reason for call: Pt calling to schedule her recall EGD for after 11/26/25. I was unsure of the diagnosis. Please advise and reach back out to the pt to schedule    Scheduled procedure/appointment date if applicable: Apt/procedure

## 2025-06-23 ENCOUNTER — PREP FOR PROCEDURE (OUTPATIENT)
Dept: GASTROENTEROLOGY | Facility: CLINIC | Age: 65
End: 2025-06-23

## 2025-06-23 DIAGNOSIS — K22.70 BARRETT'S ESOPHAGUS WITHOUT DYSPLASIA: Primary | ICD-10-CM

## 2025-06-30 ENCOUNTER — TELEPHONE (OUTPATIENT)
Age: 65
End: 2025-06-30

## 2025-06-30 NOTE — TELEPHONE ENCOUNTER
Left VM for patient. Advised that insurance will give pushback since patient has not  been seen in 6 months. Advised her to call back to schedule FU apt and we can go from there. Please schedule patient if she calls back.

## 2025-06-30 NOTE — TELEPHONE ENCOUNTER
Caller: Patient    Doctor: Dr. Ford    Reason for call: The patient has completed her Radiation Therapy. As discussed at her last ov, she questioned if she could have an order for a right knee MRI and then will schedule a follow up w/Dr Ford? Please contact the patient when the order is placed    Call back#: [phone]

## 2025-07-09 ENCOUNTER — OFFICE VISIT (OUTPATIENT)
Dept: OBGYN CLINIC | Facility: CLINIC | Age: 65
End: 2025-07-09
Payer: COMMERCIAL

## 2025-07-09 VITALS — HEIGHT: 63 IN | BODY MASS INDEX: 34.55 KG/M2 | WEIGHT: 195 LBS

## 2025-07-09 DIAGNOSIS — M23.91 INTERNAL DERANGEMENT OF RIGHT KNEE: Primary | ICD-10-CM

## 2025-07-09 PROCEDURE — 99213 OFFICE O/P EST LOW 20 MIN: CPT | Performed by: STUDENT IN AN ORGANIZED HEALTH CARE EDUCATION/TRAINING PROGRAM

## 2025-07-09 NOTE — PROGRESS NOTES
Ortho Sports Medicine Knee New Patient Visit     Assesment:   65 y.o. female with right knee pain without a specific injury and x-ray showing no significant degenerative disease     Plan:  I did review the patient's history, exam, and imaging in clinic today.  I did review the radiographs that show minimal degenerative changes.  The patient continues to have medial knee pain for several years. She does continue to experience mechanical symptoms, clicking, and popping in the knee. On exam, she does have pain with deep knee flexion. She has pain with palpation along the medial joint line. An MRI was ordered to rule out a medial meniscus tear. She will follow up once the MRI is complete. The patient demonstrated understanding of the discussion and was in agreement with the plan.  All of the questions were answered.  Patient can reach out to clinic with any questions or concerns at any time.      Follow up:  Return for After MRI.        Chief Complaint   Patient presents with   • Right Knee - Pain, Swelling, Follow-up       History of Present Illness:  The patient is a 65 y.o. female seen in clinic for right knee pain.  The patient was last seen in office on 12/5/2024 where there was a concern for a mensicus tear. The patient underwent radiation since her last visit. She continues to experience right knee pain, which is worsened with activities. She reports pain with weightbearing and deep knee flexion. The patient reports continued clicking and popping in the knee. She denies instability.     Previous History: 12/5/2024  Localizes the pain to the anterior lateral side of the knee.  She states that she bumped that area a few years ago but her symptoms resolved.  Recently, she noticed that the knee was stiff with limited range of motion as well as difficulty walking over the past weekend.  She also noted swelling, clicking, and popping.  She was seen in emergency department she had Toradol which did help with her  symptoms.  Patient did note that she has breast cancer and will be starting radiation soon.  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  Patient mentioned that she surgery on the knee, likely meniscectomy 30 years ago.      Past Medical, Social and Family History:  Past Medical History:   Diagnosis Date   • Anxiety    • Cancer (HCC)    • Colon polyp    • Depression    • GERD (gastroesophageal reflux disease)    • Hypertension      Past Surgical History:   Procedure Laterality Date   •  SECTION     • CHOLECYSTECTOMY     • COLONOSCOPY     • ID MASTECTOMY PARTIAL Right 2024    Procedure: LUMPECTOMY BREAST WITH DAVIDE  LOCALIZATION, BIOPSY LYMPH NODE SENTINEL (INJECT AT 0900 BY DR SHEN IN THE OR);  Surgeon: Laly Shen MD;  Location: [facility];  Service: General   • US BREAST CLIP NEEDLE LOC RIGHT Right 10/29/2024   • US GUIDED BREAST BIOPSY RIGHT COMPLETE Right 2022   • US GUIDED BREAST BIOPSY RIGHT COMPLETE Right 2024     No Known Allergies  Current Outpatient Medications on File Prior to Visit   Medication Sig Dispense Refill   • omeprazole (PriLOSEC) 40 MG capsule Take 1 capsule (40 mg total) by mouth daily 30 capsule 11     No current facility-administered medications on file prior to visit.     Social History     Socioeconomic History   • Marital status: /Civil Union     Spouse name: Not on file   • Number of children: Not on file   • Years of education: Not on file   • Highest education level: Not on file   Occupational History   • Not on file   Tobacco Use   • Smoking status: Former     Current packs/day: 0.00     Types: Cigarettes     Quit date: 2018     Years since quittin.6   • Smokeless tobacco: Never   • Tobacco comments:     Nicotine vape user   Vaping Use   • Vaping status: Every Day   • Substances: Nicotine   Substance and Sexual Activity   • Alcohol use: Not Currently   • Drug use: Never   • Sexual activity: Not Currently     Partners: Male  "  Other Topics Concern   • Not on file   Social History Narrative   • Not on file     Social Drivers of Health     Financial Resource Strain: Not on file   Food Insecurity: Not on file   Transportation Needs: Not on file   Physical Activity: Not on file   Stress: Not on file   Social Connections: Not on file   Intimate Partner Violence: Not on file   Housing Stability: Not on file         I have reviewed the past medical, surgical, social and family history, medications and allergies as documented in the EMR.    Review of systems: ROS is negative other than that noted in the HPI.  Constitutional: Negative for fatigue and fever.   HENT: Negative for sore throat.    Respiratory: Negative for shortness of breath.    Cardiovascular: Negative for chest pain.   Gastrointestinal: Negative for abdominal pain.   Endocrine: Negative for cold intolerance and heat intolerance.   Genitourinary: Negative for flank pain.   Musculoskeletal: Negative for back pain.   Skin: Negative for rash.   Allergic/Immunologic: Negative for immunocompromised state.   Neurological: Negative for dizziness.   Psychiatric/Behavioral: Negative for agitation.      Physical Exam:    Height 5' 3\" (1.6 m), weight 88.5 kg (195 lb), not currently breastfeeding.    General/Constitutional: NAD, well developed, well nourished  HENT: Normocephalic, atraumatic  CV: Intact distal pulses, regular rate  Resp: No respiratory distress or labored breathing  Neuro: Alert and Oriented x 3  Psych: Normal mood, normal affect, normal judgement, normal behavior  Skin: Warm, dry, no rashes, no erythema      Focused right knee exam:  Ambulates with a antalgic gait.    Inspection:  - Skin intact  - Ecchymosis: no  - Erythema: no  - Gross deformity: no  - Previous surgical incisions: no  - Effusion: negative    Palpation:  - Medial patellar facet: no  - Lateral patellar facet: no  - Tibial tubercle: no  - Patella tendon: no  - Pes anserine bursa: no  - Gerdy's tubercle: no  - " Popliteal fossa: no    - Lateral epicondyle: no  - Medial epicondyle: yes  - Posterior calf: no    Range of motion:  - Extension: 0 degrees  - Flexion: 115 degrees  - Pain with hyperflexion: no  - Pain with hyperextension: no    Strength:  - Patient able to perform a straight leg raise  - Hip flexion: 5/5  - Knee extension: 5/5  - Knee flexion: 5/5  - Ankle DF: 5/5  - Ankle PF: 5/5    Meniscus:  - Medial joint line tenderness: yes  - Lateral joint line tenderness: no  - Melodie's: yes  - Flexion compression: no  - Thessaly test: n/a    Collateral ligaments:  -  Varus laxity at full extension: no  -  Varus laxity at full in 30° of knee flexion: no  -  Valgus laxity at full extension: no  -  Valgus laxity at full in 30° of knee flexion: no    Cruciate ligaments:  - Lachman: 1A  - Pivot shift test: no  - Anterior drawer: 1A  - Posterior drawer: 1A  - Posterior tibial sag: no    Patella:  - Apprehension with lateral patellar translation: no  - Able to sublux 1 quadrant with firm endpoint  - Apprehension with medial patellar translation: no  - Able to sublux 1 quadrant with firm endpoint  - J sign: no  - Patella grind test: no  - Patella crepitus: no  - Patella tilt: no  - Squat test: n/a    Range of motion testing of the hip and ankle are within normal limits.    No calf tenderness to palpation bilaterally. Negative Candy test    LE NV Exam:   +2 DP/PT pulses bilaterally  Foot warm, well perfused  Sensation intact to light touch L2-S1 bilaterally, SPN/DPN/tibial/saphenous/sural nerve distributions  Motor intact in SPN/DPN/TA nerve distributions      Knee Imaging    X-rays of the right knee were obtained on 12/5/2024 and reviewed with the patient. Per my independent review, the imaging shows no significant degenerative changes.        Scribe Attestation    I,:  Marcus Parker am acting as a scribe while in the presence of the attending physician.:       I,:  Juan Carlos Ford MD personally performed the services described in  this documentation    as scribed in my presence.:

## 2025-07-20 ENCOUNTER — HOSPITAL ENCOUNTER (OUTPATIENT)
Dept: RADIOLOGY | Facility: HOSPITAL | Age: 65
Discharge: HOME | End: 2025-07-20
Payer: COMMERCIAL

## 2025-07-20 ENCOUNTER — HOSPITAL ENCOUNTER (OUTPATIENT)
Dept: MRI IMAGING | Facility: HOSPITAL | Age: 65
Discharge: HOME | End: 2025-07-20
Attending: STUDENT IN AN ORGANIZED HEALTH CARE EDUCATION/TRAINING PROGRAM
Payer: COMMERCIAL

## 2025-07-20 DIAGNOSIS — M23.91 INTERNAL DERANGEMENT OF RIGHT KNEE: ICD-10-CM

## 2025-07-20 PROCEDURE — 73721 MRI JNT OF LWR EXTRE W/O DYE: CPT

## 2025-07-23 ENCOUNTER — OFFICE VISIT (OUTPATIENT)
Dept: OBGYN CLINIC | Facility: CLINIC | Age: 65
End: 2025-07-23
Payer: COMMERCIAL

## 2025-07-23 VITALS — WEIGHT: 192 LBS | HEIGHT: 63 IN | BODY MASS INDEX: 34.02 KG/M2

## 2025-07-23 DIAGNOSIS — S83.231A COMPLEX TEAR OF MEDIAL MENISCUS OF RIGHT KNEE AS CURRENT INJURY, INITIAL ENCOUNTER: Primary | ICD-10-CM

## 2025-07-23 DIAGNOSIS — M17.11 PRIMARY OSTEOARTHRITIS OF RIGHT KNEE: ICD-10-CM

## 2025-07-23 PROCEDURE — 99213 OFFICE O/P EST LOW 20 MIN: CPT | Performed by: STUDENT IN AN ORGANIZED HEALTH CARE EDUCATION/TRAINING PROGRAM

## 2025-07-23 PROCEDURE — 20610 DRAIN/INJ JOINT/BURSA W/O US: CPT | Performed by: STUDENT IN AN ORGANIZED HEALTH CARE EDUCATION/TRAINING PROGRAM

## 2025-07-23 RX ADMIN — METHYLPREDNISOLONE ACETATE 1 ML: 40 INJECTION, SUSPENSION INTRA-ARTICULAR; INTRALESIONAL; INTRAMUSCULAR; SOFT TISSUE at 15:00

## 2025-07-23 RX ADMIN — BUPIVACAINE HYDROCHLORIDE 4 ML: 2.5 INJECTION, SOLUTION INFILTRATION; PERINEURAL at 15:00

## 2025-07-23 RX ADMIN — LIDOCAINE HYDROCHLORIDE 4 ML: 20 INJECTION, SOLUTION INFILTRATION; PERINEURAL at 15:00

## 2025-07-23 NOTE — PROGRESS NOTES
Ortho Sports Medicine Knee New Patient Visit     Assessment & Plan  Complex tear of medial meniscus of right knee as current injury, initial encounter    Orders:    Large joint arthrocentesis: R knee    Primary osteoarthritis of right knee    Orders:    Large joint arthrocentesis: R knee      I did review the patient's history, exam, and imaging in clinic today.  I did review the patient's MRI with the patient in clinic today which shows a tear of the medial meniscus along with degenerative changes in the medial compartment.  On exam, patient does have a moderate effusion.  She does have pain exacerbated with deep flexion.  I discussed with the patient both the medial meniscus tear as well as the degenerative changes could be contributing to her pain.  Patient did state that her knee feels tight and is limiting her range of motion which I discussed is most likely due to the effusion.  I discussed with the patient that I recommend an aspiration and corticosteroid injection. I discussed the risks and benefits of a corticosteroid injection, including risk of a flare reaction, infection, and increase in blood sugar particularly in the setting of diabetes.  After a long discussion, the patient was still amenable to the aspiration and injection. I aspirated 35 cc of yellow, serous fluid and then performed a corticosteroid injection into the left knee.  The patient tolerated the procedure well with no adverse reaction.  The patient was distally neurovascular intact after the injection.  Patient will follow-up on an as-needed basis. The patient demonstrated understanding of the discussion and was in agreement with the plan.  All of the questions were answered.  Patient can reach out to clinic with any questions or concerns at any time. The patient was advised that anything we do could affect future care or surgery.      Follow up: PRN  Imaging: none        Chief Complaint   Patient presents with    Right Knee - Pain        History of Present Illness:  Patient is a 65-year-old female seen in clinic for right knee pain.  She presents to review results of her MRI right knee.  Patient feels that her pain has worsened since she was last evaluated.  She complains of tightness and swelling.  Patient states the pain is worse with weightbearing and knee flexion.  She does have some clicking and popping.    Prior history 2025  The patient is a 65 y.o. female seen in clinic for right knee pain.  The patient was last seen in office on 2024 where there was a concern for a mensicus tear. The patient underwent radiation since her last visit. She continues to experience right knee pain, which is worsened with activities. She reports pain with weightbearing and deep knee flexion. The patient reports continued clicking and popping in the knee. She denies instability.     Previous History: 2024  Localizes the pain to the anterior lateral side of the knee.  She states that she bumped that area a few years ago but her symptoms resolved.  Recently, she noticed that the knee was stiff with limited range of motion as well as difficulty walking over the past weekend.  She also noted swelling, clicking, and popping.  She was seen in emergency department she had Toradol which did help with her symptoms.  Patient did note that she has breast cancer and will be starting radiation soon.  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  Patient mentioned that she surgery on the knee, likely meniscectomy 30 years ago.      Past Medical, Social and Family History:  Past Medical History:   Diagnosis Date    Anxiety     Cancer (HCC)     Colon polyp     Depression     GERD (gastroesophageal reflux disease)     Hypertension      Past Surgical History:   Procedure Laterality Date     SECTION      CHOLECYSTECTOMY      COLONOSCOPY      NM MASTECTOMY PARTIAL Right 2024    Procedure: LUMPECTOMY BREAST WITH DAVIDE  LOCALIZATION, BIOPSY  LYMPH NODE SENTINEL (INJECT AT 0900 BY DR SHEN IN THE OR);  Surgeon: Laly Shen MD;  Location: [facility];  Service: General    US BREAST CLIP NEEDLE LOC RIGHT Right 10/29/2024    US GUIDED BREAST BIOPSY RIGHT COMPLETE Right 2022    US GUIDED BREAST BIOPSY RIGHT COMPLETE Right 2024     No Known Allergies  Current Outpatient Medications on File Prior to Visit   Medication Sig Dispense Refill    omeprazole (PriLOSEC) 40 MG capsule Take 1 capsule (40 mg total) by mouth daily 30 capsule 11     No current facility-administered medications on file prior to visit.     Social History     Socioeconomic History    Marital status: /Civil Union     Spouse name: Not on file    Number of children: Not on file    Years of education: Not on file    Highest education level: Not on file   Occupational History    Not on file   Tobacco Use    Smoking status: Former     Current packs/day: 0.00     Types: Cigarettes     Quit date: 2018     Years since quittin.7    Smokeless tobacco: Never    Tobacco comments:     Nicotine vape user   Vaping Use    Vaping status: Every Day    Substances: Nicotine   Substance and Sexual Activity    Alcohol use: Not Currently    Drug use: Never    Sexual activity: Not Currently     Partners: Male   Other Topics Concern    Not on file   Social History Narrative    Not on file     Social Drivers of Health     Financial Resource Strain: Not on file   Food Insecurity: Not on file   Transportation Needs: Not on file   Physical Activity: Not on file   Stress: Not on file   Social Connections: Not on file   Intimate Partner Violence: Not on file   Housing Stability: Not on file         I have reviewed the past medical, surgical, social and family history, medications and allergies as documented in the EMR.    Review of systems: ROS is negative other than that noted in the HPI.  Constitutional: Negative for fatigue and fever.   HENT: Negative for sore throat.    Respiratory:  "Negative for shortness of breath.    Cardiovascular: Negative for chest pain.   Gastrointestinal: Negative for abdominal pain.   Endocrine: Negative for cold intolerance and heat intolerance.   Genitourinary: Negative for flank pain.   Musculoskeletal: Negative for back pain.   Skin: Negative for rash.   Allergic/Immunologic: Negative for immunocompromised state.   Neurological: Negative for dizziness.   Psychiatric/Behavioral: Negative for agitation.      Physical Exam:    Height 5' 3\" (1.6 m), weight 87.1 kg (192 lb), not currently breastfeeding.    General/Constitutional: NAD, well developed, well nourished  HENT: Normocephalic, atraumatic  CV: Intact distal pulses, regular rate  Resp: No respiratory distress or labored breathing  Neuro: Alert and Oriented x 3  Psych: Normal mood, normal affect, normal judgement, normal behavior  Skin: Warm, dry, no rashes, no erythema      Focused right knee exam:  Ambulates with a antalgic gait.    Inspection:  - Skin intact  - Ecchymosis: no  - Erythema: no  - Gross deformity: no  - Previous surgical incisions: no  - Effusion: moderate    Palpation:  - Medial patellar facet: no  - Lateral patellar facet: no  - Tibial tubercle: no  - Patella tendon: no  - Pes anserine bursa: no  - Gerdy's tubercle: no  - Popliteal fossa: no    - Lateral epicondyle: no  - Medial epicondyle: yes  - Posterior calf: no    Range of motion:  - Extension: 0 degrees  - Flexion: 115 degrees  - Pain with hyperflexion: yes  - Pain with hyperextension: yes    Strength:  - Patient able to perform a straight leg raise  - Hip flexion: 5/5  - Knee extension: 5/5  - Knee flexion: 5/5  - Ankle DF: 5/5  - Ankle PF: 5/5    Meniscus:  - Medial joint line tenderness: yes  - Lateral joint line tenderness: no  - Melodie's: yes  - Flexion compression: no  - Thessaly test: n/a    Collateral ligaments:  -  Varus laxity at full extension: no  -  Varus laxity at full in 30° of knee flexion: no  -  Valgus laxity at full " extension: no  -  Valgus laxity at full in 30° of knee flexion: no    Cruciate ligaments:  - Lachman: 1A  - Pivot shift test: no  - Anterior drawer: 1A  - Posterior drawer: 1A  - Posterior tibial sag: no    Patella:  - Apprehension with lateral patellar translation: no  - Able to sublux 1 quadrant with firm endpoint  - Apprehension with medial patellar translation: no  - Able to sublux 1 quadrant with firm endpoint  - J sign: no  - Patella grind test: no  - Patella crepitus: no  - Patella tilt: no  - Squat test: n/a    Range of motion testing of the hip and ankle are within normal limits.    No calf tenderness to palpation bilaterally. Negative Candy test    LE NV Exam:   +2 DP/PT pulses bilaterally  Foot warm, well perfused  Sensation intact to light touch L2-S1 bilaterally, SPN/DPN/tibial/saphenous/sural nerve distributions  Motor intact in SPN/DPN/TA nerve distributions      Knee Imaging    X-rays of the right knee were obtained on 12/5/2024 and reviewed with the patient. Per my independent review, the imaging shows no significant degenerative changes.    MRI of the right knee was obtained on 7/20/2025 and reviewed with the patient. Per my independent review, the imaging shows a tear of the posterior horn of the medial meniscus without displaced fragments. There is also chondromalacia of the MFC and medial tibial plateau. ACL and PCL intact. No tear of the lateral meniscus.      Procedures:  Large joint arthrocentesis: R knee    Performed by: Juan Carlos Ford MD  Authorized by: Juan Carlos Ford MD    Universal Protocol:  Consent: Verbal consent obtained  Consent given by: patient  Patient understanding: patient states understanding of the procedure being performed  Site marked: the operative site was marked  Radiology Images displayed and confirmed. If images not available, report reviewed: imaging studies available  Patient identity confirmed: verbally with patient  Supporting Documentation  Indications: pain and  joint swelling     Is this a Visco injection? NoProcedure Details  Location: knee - R knee  Needle size: 18 G (21 G)  Ultrasound guidance: no  Approach: lateral  Medications administered: 1 mL methylPREDNISolone acetate 40 mg/mL; 4 mL lidocaine 2 %; 4 mL bupivacaine 0.25 %    Aspirate amount: 35 mL  Aspirate: serous and yellow  Patient tolerance: patient tolerated the procedure well with no immediate complications  Dressing:  Sterile dressing applied        Scribe Attestation      I,:  Sudheer Tejada PA-C am acting as a scribe while in the presence of the attending physician.:       I,:  Juan Carlos Ford MD personally performed the services described in this documentation    as scribed in my presence.:

## 2025-07-24 RX ORDER — METHYLPREDNISOLONE ACETATE 40 MG/ML
1 INJECTION, SUSPENSION INTRA-ARTICULAR; INTRALESIONAL; INTRAMUSCULAR; SOFT TISSUE
Status: COMPLETED | OUTPATIENT
Start: 2025-07-23 | End: 2025-07-23

## 2025-07-24 RX ORDER — LIDOCAINE HYDROCHLORIDE 20 MG/ML
4 INJECTION, SOLUTION INFILTRATION; PERINEURAL
Status: COMPLETED | OUTPATIENT
Start: 2025-07-23 | End: 2025-07-23

## 2025-07-24 RX ORDER — BUPIVACAINE HYDROCHLORIDE 2.5 MG/ML
4 INJECTION, SOLUTION INFILTRATION; PERINEURAL
Status: COMPLETED | OUTPATIENT
Start: 2025-07-23 | End: 2025-07-23

## 2025-08-08 ENCOUNTER — PATIENT OUTREACH (OUTPATIENT)
Dept: HEMATOLOGY ONCOLOGY | Facility: CLINIC | Age: 65
End: 2025-08-08

## 2025-08-09 ENCOUNTER — TRANSCRIBE ORDERS (OUTPATIENT)
Dept: LAB | Facility: CLINIC | Age: 65
End: 2025-08-09

## 2025-08-09 ENCOUNTER — APPOINTMENT (OUTPATIENT)
Dept: LAB | Facility: CLINIC | Age: 65
End: 2025-08-09
Payer: COMMERCIAL

## 2025-08-09 DIAGNOSIS — E78.5 HYPERLIPIDEMIA, UNSPECIFIED HYPERLIPIDEMIA TYPE: ICD-10-CM

## 2025-08-09 DIAGNOSIS — R03.0 ELEVATED BLOOD PRESSURE READING WITHOUT DIAGNOSIS OF HYPERTENSION: Primary | ICD-10-CM

## 2025-08-13 ENCOUNTER — OFFICE VISIT (OUTPATIENT)
Dept: OBGYN CLINIC | Facility: CLINIC | Age: 65
End: 2025-08-13
Payer: COMMERCIAL

## 2025-08-22 ENCOUNTER — ANESTHESIA (OUTPATIENT)
Dept: PERIOP | Facility: HOSPITAL | Age: 65
End: 2025-08-22
Payer: COMMERCIAL

## 2025-08-22 ENCOUNTER — ANESTHESIA EVENT (OUTPATIENT)
Dept: PERIOP | Facility: HOSPITAL | Age: 65
End: 2025-08-22
Payer: COMMERCIAL

## 2025-08-22 ENCOUNTER — ANESTHESIA (OUTPATIENT)
Dept: ANESTHESIOLOGY | Facility: HOSPITAL | Age: 65
End: 2025-08-22

## 2025-08-22 ENCOUNTER — ANESTHESIA EVENT (OUTPATIENT)
Dept: ANESTHESIOLOGY | Facility: HOSPITAL | Age: 65
End: 2025-08-22

## 2025-08-22 PROBLEM — S83.241A OTHER TEAR OF MEDIAL MENISCUS, CURRENT INJURY, RIGHT KNEE, INITIAL ENCOUNTER: Status: ACTIVE | Noted: 2025-08-22

## 2025-08-22 RX ORDER — HYDROMORPHONE HCL/PF 1 MG/ML
SYRINGE (ML) INJECTION AS NEEDED
Status: DISCONTINUED | OUTPATIENT
Start: 2025-08-22 | End: 2025-08-22

## 2025-08-22 RX ORDER — DEXAMETHASONE SODIUM PHOSPHATE 10 MG/ML
INJECTION, SOLUTION INTRAMUSCULAR; INTRAVENOUS AS NEEDED
Status: DISCONTINUED | OUTPATIENT
Start: 2025-08-22 | End: 2025-08-22

## 2025-08-22 RX ORDER — MIDAZOLAM HYDROCHLORIDE 2 MG/2ML
INJECTION, SOLUTION INTRAMUSCULAR; INTRAVENOUS AS NEEDED
Status: DISCONTINUED | OUTPATIENT
Start: 2025-08-22 | End: 2025-08-22

## 2025-08-22 RX ORDER — EPHEDRINE SULFATE 50 MG/ML
INJECTION INTRAVENOUS AS NEEDED
Status: DISCONTINUED | OUTPATIENT
Start: 2025-08-22 | End: 2025-08-22

## 2025-08-22 RX ORDER — FENTANYL CITRATE 50 UG/ML
INJECTION, SOLUTION INTRAMUSCULAR; INTRAVENOUS AS NEEDED
Status: DISCONTINUED | OUTPATIENT
Start: 2025-08-22 | End: 2025-08-22

## 2025-08-22 RX ORDER — PROPOFOL 10 MG/ML
INJECTION, EMULSION INTRAVENOUS AS NEEDED
Status: DISCONTINUED | OUTPATIENT
Start: 2025-08-22 | End: 2025-08-22

## 2025-08-22 RX ORDER — LIDOCAINE HYDROCHLORIDE 20 MG/ML
INJECTION, SOLUTION EPIDURAL; INFILTRATION; INTRACAUDAL; PERINEURAL AS NEEDED
Status: DISCONTINUED | OUTPATIENT
Start: 2025-08-22 | End: 2025-08-22

## 2025-08-22 RX ADMIN — LIDOCAINE HYDROCHLORIDE 100 MG: 20 INJECTION, SOLUTION EPIDURAL; INFILTRATION; INTRACAUDAL at 12:44

## 2025-08-22 RX ADMIN — EPHEDRINE SULFATE 5 MG: 50 INJECTION, SOLUTION INTRAVENOUS at 13:04

## 2025-08-22 RX ADMIN — HYDROMORPHONE HYDROCHLORIDE 0.25 MG: 1 INJECTION, SOLUTION INTRAMUSCULAR; INTRAVENOUS; SUBCUTANEOUS at 13:32

## 2025-08-22 RX ADMIN — FENTANYL CITRATE 25 MCG: 50 INJECTION INTRAMUSCULAR; INTRAVENOUS at 12:47

## 2025-08-22 RX ADMIN — FENTANYL CITRATE 25 MCG: 50 INJECTION INTRAMUSCULAR; INTRAVENOUS at 12:51

## 2025-08-22 RX ADMIN — MIDAZOLAM 2 MG: 1 INJECTION INTRAMUSCULAR; INTRAVENOUS at 12:40

## 2025-08-22 RX ADMIN — HYDROMORPHONE HYDROCHLORIDE 0.75 MG: 1 INJECTION, SOLUTION INTRAMUSCULAR; INTRAVENOUS; SUBCUTANEOUS at 13:51

## 2025-08-22 RX ADMIN — PROPOFOL 150 MG: 10 INJECTION, EMULSION INTRAVENOUS at 12:44

## 2025-08-22 RX ADMIN — CEFAZOLIN SODIUM 2000 MG: 2 SOLUTION INTRAVENOUS at 12:45

## 2025-08-22 RX ADMIN — EPHEDRINE SULFATE 10 MG: 50 INJECTION, SOLUTION INTRAVENOUS at 12:55

## 2025-08-22 RX ADMIN — FENTANYL CITRATE 50 MCG: 50 INJECTION INTRAMUSCULAR; INTRAVENOUS at 13:11

## 2025-08-22 RX ADMIN — DEXAMETHASONE SODIUM PHOSPHATE 10 MG: 10 INJECTION, SOLUTION INTRAMUSCULAR; INTRAVENOUS at 12:44

## (undated) DEVICE — NEEDLE 25G X 1 1/2

## (undated) DEVICE — SHEATH, GUIDE, SAVI SCOUT®: Brand: SAVI SCOUT®

## (undated) DEVICE — SUT MONOCRYL 4-0 PS-2 27 IN Y426H

## (undated) DEVICE — DRAPE TOWEL: Brand: CONVERTORS

## (undated) DEVICE — GLOVE INDICATOR PI UNDERGLOVE SZ 7 BLUE

## (undated) DEVICE — NEPTUNE E-SEP SMOKE EVACUATION PENCIL, COATED, 70MM BLADE, PUSH BUTTON SWITCH: Brand: NEPTUNE E-SEP

## (undated) DEVICE — STERILE MUSCLE FLAP PACK: Brand: CARDINAL HEALTH

## (undated) DEVICE — CHLORAPREP HI-LITE 26ML ORANGE

## (undated) DEVICE — SUT ETHILON 2-0 FS 18 IN 664H

## (undated) DEVICE — HANDPIECE, SINGLE-USE, SAVI SCOUT®: Brand: SAVI SCOUT®

## (undated) DEVICE — GLOVE SRG BIOGEL 7

## (undated) DEVICE — LIGACLIP MCA MULTIPLE CLIP APPLIERS, 20 MEDIUM CLIPS: Brand: LIGACLIP

## (undated) DEVICE — PROBE GAMMA TRUNODE

## (undated) DEVICE — ELECTRODE BLADE MOD E-Z CLEAN  2.75IN 7CM -0012AM

## (undated) DEVICE — SYRINGE 10ML LL

## (undated) DEVICE — HARMONIC FOCUS SHEARS 9CM LENGTH + ADAPTIVE TISSUE TECHNOLOGY FOR USE WITH BLUE HAND PIECE ONLY: Brand: HARMONIC FOCUS

## (undated) DEVICE — ANTIBACTERIAL UNDYED BRAIDED (POLYGLACTIN 910), SYNTHETIC ABSORBABLE SUTURE: Brand: COATED VICRYL

## (undated) DEVICE — INTENDED FOR TISSUE SEPARATION, AND OTHER PROCEDURES THAT REQUIRE A SHARP SURGICAL BLADE TO PUNCTURE OR CUT.: Brand: BARD-PARKER ® CARBON RIB-BACK BLADES

## (undated) DEVICE — BRA SURGICAL SZ MED (33-36)

## (undated) DEVICE — MICRO HVTSA, 0.5G AND HVTSA SOURCEMARK PRODUCT CODE M1206 AND M1206-01: Brand: EXOFIN MICRO HVTSA, 0.5G

## (undated) DEVICE — MARKER MARGIN 6 COLOR STANDARD

## (undated) DEVICE — DISPOSABLE OR TOWEL: Brand: CARDINAL HEALTH

## (undated) DEVICE — 4-PORT MANIFOLD: Brand: NEPTUNE 2